# Patient Record
Sex: MALE | Race: BLACK OR AFRICAN AMERICAN | Employment: FULL TIME | URBAN - METROPOLITAN AREA
[De-identification: names, ages, dates, MRNs, and addresses within clinical notes are randomized per-mention and may not be internally consistent; named-entity substitution may affect disease eponyms.]

---

## 2017-04-05 DIAGNOSIS — I10 ESSENTIAL HYPERTENSION, BENIGN: ICD-10-CM

## 2017-04-05 RX ORDER — LISINOPRIL AND HYDROCHLOROTHIAZIDE 12.5; 2 MG/1; MG/1
1 TABLET ORAL DAILY
Qty: 90 TAB | Refills: 3 | Status: SHIPPED | OUTPATIENT
Start: 2017-04-05 | End: 2017-05-11 | Stop reason: SDUPTHER

## 2017-04-05 NOTE — TELEPHONE ENCOUNTER
4/5/2017    Requested Prescriptions     Pending Prescriptions Disp Refills    lisinopril-hydroCHLOROthiazide (PRINZIDE, ZESTORETIC) 20-12.5 mg per tablet 90 Tab 3     Sig: Take 1 Tab by mouth daily. Quantity requested: 90 day supply  [facility]     Patient advised of 72 hour turnaround: Yes    Other Comments: pt states that he will have about 5 more days left of this medication.

## 2017-04-20 ENCOUNTER — OFFICE VISIT (OUTPATIENT)
Dept: INTERNAL MEDICINE CLINIC | Age: 47
End: 2017-04-20

## 2017-04-20 VITALS
SYSTOLIC BLOOD PRESSURE: 116 MMHG | TEMPERATURE: 98.2 F | WEIGHT: 202 LBS | OXYGEN SATURATION: 98 % | HEART RATE: 89 BPM | BODY MASS INDEX: 26.77 KG/M2 | HEIGHT: 73 IN | DIASTOLIC BLOOD PRESSURE: 76 MMHG

## 2017-04-20 DIAGNOSIS — Z80.0 FH: COLON CANCER: ICD-10-CM

## 2017-04-20 DIAGNOSIS — E78.00 PURE HYPERCHOLESTEROLEMIA: ICD-10-CM

## 2017-04-20 DIAGNOSIS — I10 ESSENTIAL HYPERTENSION, BENIGN: Primary | ICD-10-CM

## 2017-04-20 NOTE — MR AVS SNAPSHOT
Visit Information Date & Time Provider Department Dept. Phone Encounter #  
 4/20/2017  8:45 AM Carol Goins, [address] [place] [address],[address] [phone] [id number] Follow-up Instructions Return in about 1 year (around 4/20/2018) for htn hld. Upcoming Health Maintenance Date Due DTaP/Tdap/Td series (1 - Tdap) 3/14/1991 INFLUENZA AGE 9 TO ADULT 8/1/2016 Allergies as of 4/20/2017  Review Complete On: 4/20/2017 By: Carol Goins MD  
 No Known Allergies Current Immunizations  Never Reviewed Name Date Influenza Vaccine Split 11/11/2010 Not reviewed this visit You Were Diagnosed With   
  
 Codes Comments Essential hypertension, benign    -  Primary ICD-10-CM: I10 
ICD-9-CM: 401.1 Pure hypercholesterolemia     ICD-10-CM: E78.00 ICD-9-CM: 272.0 FH: colon cancer     ICD-10-CM: Z80.0 ICD-9-CM: V16.0 Vitals BP Pulse Temp Height(growth percentile) Weight(growth percentile) SpO2  
 116/76 (BP 1 Location: Left arm, BP Patient Position: Sitting) 89 98.2 °F (36.8 °C) (Oral) 6' 1\" (1.854 m) 202 lb (91.6 kg) 98% BMI Smoking Status 26.65 kg/m2 Never Smoker BMI and BSA Data Body Mass Index Body Surface Area  
 26.65 kg/m 2 2.17 m 2 Preferred Pharmacy Pharmacy Name Phone [facility]Y [id number] [address] [phone] Your Updated Medication List  
  
   
This list is accurate as of: 4/20/17  9:29 AM.  Always use your most recent med list.  
  
  
  
  
 atorvastatin 20 mg tablet Commonly known as:  LIPITOR Take 1 Tab by mouth nightly. lisinopril-hydroCHLOROthiazide 20-12.5 mg per tablet Commonly known as:  Leslie Nation Take 1 Tab by mouth daily. MULTIVITAL PO Take  by mouth. We Performed the Following CBC W/O DIFF [99848 CPT(R)] LIPID PANEL [06254 CPT(R)] METABOLIC PANEL, COMPREHENSIVE [48440 CPT(R)] REFERRAL TO GASTROENTEROLOGY [FGU32 Custom] Comments:  
 Please evaluate patient for screening colonoscopy TSH 3RD GENERATION [94846 CPT(R)] Follow-up Instructions Return in about 1 year (around 4/20/2018) for htn hld. Referral Information Referral ID Referred By Referred To  
  
 [id number] BRETT, [address] [facility] [address] [facility] [address], [address] Visits Status Start Date End Date 1 New Request 4/20/17 4/20/18 If your referral has a status of pending review or denied, additional information will be sent to support the outcome of this decision. Introducing [facility] & HEALTH SERVICES! Yanni Khan introduces [employer] patient portal. Now you can access parts of your medical record, email your doctor's office, and request medication refills online. 1. In your internet browser, go to [email or web address] 2. Click on the First Time User? Click Here link in the Sign In box. You will see the New Member Sign Up page. 3. Enter your [employer] Access Code exactly as it appears below. You will not need to use this code after youve completed the sign-up process. If you do not sign up before the expiration date, you must request a new code. · [employer] Access Code: [email or web address] Expires: 7/19/2017  9:29 AM 
 
4. Enter the last four digits of your Social Security Number (xxxx) and Date of Birth (mm/dd/yyyy) as indicated and click Submit. You will be taken to the next sign-up page. 5. Create a [employer] ID. This will be your [employer] login ID and cannot be changed, so think of one that is secure and easy to remember. 6. Create a [employer] password. You can change your password at any time. 7. Enter your Password Reset Question and Answer. This can be used at a later time if you forget your password. 8. Enter your e-mail address.  You will receive e-mail notification when new information is available in Roadmunk. 9. Click Sign Up. You can now view and download portions of your medical record. 10. Click the Download Summary menu link to download a portable copy of your medical information. If you have questions, please visit the Frequently Asked Questions section of the [employer] website. Remember, [employer] is NOT to be used for urgent needs. For medical emergencies, dial 911. Now available from your iPhone and Android! Please provide this summary of care documentation to your next provider. Your primary care clinician is listed as Bernardino WEST. If you have any questions after today's visit, please call [phone].

## 2017-04-20 NOTE — PROGRESS NOTES
HISTORY OF PRESENT ILLNESS  Belinda Quiroz is a 52 y.o. male. HPI     F/u HTN and HLD  Not taking lipitor  Mother just dx with colon cancer at age 79  Had colonocopy about 10 yrs ago--negative per pt  Weight up a few lbs , not exercising but generally feels ok  No cp or sob sxs  Patient Active Problem List    Diagnosis Date Noted    Panic disorder 11/11/2010    Essential hypertension, benign 05/13/2010    Microscopic hematuria 05/13/2010    Gynecomastia, male 05/13/2010    Pure hypercholesterolemia 05/13/2010     Current Outpatient Prescriptions   Medication Sig Dispense Refill    lisinopril-hydroCHLOROthiazide (PRINZIDE, ZESTORETIC) 20-12.5 mg per tablet Take 1 Tab by mouth daily. 90 Tab 3    FA/MV,CA,FE,MIN/LYCOPENE/LUT (MULTIVITAL PO) Take  by mouth.  atorvastatin (LIPITOR) 20 mg tablet Take 1 Tab by mouth nightly. 30 Tab 1     No Known Allergies  Social History   Substance Use Topics    Smoking status: Never Smoker    Smokeless tobacco: Never Used    Alcohol use Yes      Comment: occasional      Lab Results  Component Value Date/Time   Glucose 85 04/10/2015 09:20 AM   LDL, calculated 129 04/10/2015 09:20 AM   Creatinine 1.02 04/10/2015 09:20 AM      Lab Results  Component Value Date/Time   Cholesterol, total 201 04/10/2015 09:20 AM   HDL Cholesterol 57 04/10/2015 09:20 AM   LDL, calculated 129 04/10/2015 09:20 AM   Triglyceride 77 04/10/2015 09:20 AM       Lab Results  Component Value Date/Time   GFR est  04/10/2015 09:20 AM   GFR est non-AA 88 04/10/2015 09:20 AM   Creatinine 1.02 04/10/2015 09:20 AM   BUN 17 04/10/2015 09:20 AM   Sodium 140 04/10/2015 09:20 AM   Potassium 4.4 04/10/2015 09:20 AM   Chloride 99 04/10/2015 09:20 AM   CO2 28 04/10/2015 09:20 AM         ROS    Physical Exam   Constitutional: He appears well-developed and well-nourished. No distress. Appears stated age, overweight, nad   HENT:   Head: Normocephalic.    Cardiovascular: Normal rate, regular rhythm and normal heart sounds. Exam reveals no gallop and no friction rub. Pulmonary/Chest: Effort normal and breath sounds normal. No respiratory distress. He has no wheezes. He has no rales. He exhibits no tenderness. Abdominal: Soft. Musculoskeletal: He exhibits no edema. Neurological: He is alert. Psychiatric: He has a normal mood and affect. Nursing note and vitals reviewed. 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 Diagnoses and all orders for this visit:    Essential hypertension, benign  -     CBC W/O DIFF  -     METABOLIC PANEL, COMPREHENSIVE   Good control    Pure hypercholesterolemia  -     LIPID PANEL  -     TSH 3RD GENERATION   Managing diet -encouraged regular exercise  FH: colon cancer  -     REFERRAL TO GASTROENTEROLOGY      Follow-up Disposition:  Return in about 1 year (around 4/20/2018) for htn hld.

## 2017-04-21 LAB
ALBUMIN SERPL-MCNC: 4.5 G/DL (ref 3.5–5.5)
ALBUMIN/GLOB SERPL: 1.7 {RATIO} (ref 1.2–2.2)
ALP SERPL-CCNC: 48 IU/L (ref 39–117)
ALT SERPL-CCNC: 31 IU/L (ref 0–44)
AST SERPL-CCNC: 23 IU/L (ref 0–40)
BILIRUB SERPL-MCNC: 0.6 MG/DL (ref 0–1.2)
BUN SERPL-MCNC: 13 MG/DL (ref 6–24)
BUN/CREAT SERPL: 14 (ref 9–20)
CALCIUM SERPL-MCNC: 9.5 MG/DL (ref 8.7–10.2)
CHLORIDE SERPL-SCNC: 99 MMOL/L (ref 96–106)
CHOLEST SERPL-MCNC: 200 MG/DL (ref 100–199)
CO2 SERPL-SCNC: 25 MMOL/L (ref 18–29)
CREAT SERPL-MCNC: 0.91 MG/DL (ref 0.76–1.27)
ERYTHROCYTE [DISTWIDTH] IN BLOOD BY AUTOMATED COUNT: 13.5 % (ref 12.3–15.4)
GLOBULIN SER CALC-MCNC: 2.6 G/DL (ref 1.5–4.5)
GLUCOSE SERPL-MCNC: 84 MG/DL (ref 65–99)
HCT VFR BLD AUTO: 45.3 % (ref 37.5–51)
HDLC SERPL-MCNC: 60 MG/DL
HGB BLD-MCNC: 15.3 G/DL (ref 12.6–17.7)
LDLC SERPL CALC-MCNC: 125 MG/DL (ref 0–99)
MCH RBC QN AUTO: 29.7 PG (ref 26.6–33)
MCHC RBC AUTO-ENTMCNC: 33.8 G/DL (ref 31.5–35.7)
MCV RBC AUTO: 88 FL (ref 79–97)
PLATELET # BLD AUTO: 227 X10E3/UL (ref 150–379)
POTASSIUM SERPL-SCNC: 4.2 MMOL/L (ref 3.5–5.2)
PROT SERPL-MCNC: 7.1 G/DL (ref 6–8.5)
RBC # BLD AUTO: 5.15 X10E6/UL (ref 4.14–5.8)
SODIUM SERPL-SCNC: 139 MMOL/L (ref 134–144)
TRIGL SERPL-MCNC: 74 MG/DL (ref 0–149)
TSH SERPL DL<=0.005 MIU/L-ACNC: 1.1 UIU/ML (ref 0.45–4.5)
VLDLC SERPL CALC-MCNC: 15 MG/DL (ref 5–40)
WBC # BLD AUTO: 5.2 X10E3/UL (ref 3.4–10.8)

## 2017-05-11 DIAGNOSIS — I10 ESSENTIAL HYPERTENSION, BENIGN: ICD-10-CM

## 2017-05-11 RX ORDER — LISINOPRIL AND HYDROCHLOROTHIAZIDE 12.5; 2 MG/1; MG/1
1 TABLET ORAL DAILY
Qty: 90 TAB | Refills: 3 | Status: SHIPPED | OUTPATIENT
Start: 2017-05-11 | End: 2018-08-02 | Stop reason: SDUPTHER

## 2017-05-11 NOTE — TELEPHONE ENCOUNTER
August Verde is requesting to have the Rx Lisinopril hctz 20-12.5mg tabs  written as 90 day supply with refills to [facility] [phone]. Please contact upon completion.        Message received & copied from [facility] after closing on 5/10/17

## 2018-08-02 DIAGNOSIS — I10 ESSENTIAL HYPERTENSION, BENIGN: ICD-10-CM

## 2018-08-02 NOTE — TELEPHONE ENCOUNTER
Patient made appt for 9/6/18. Brock Deleon is out of refills on his lisinopril and will need just one more refill to get him through to his appt date. [facility] [address]  His number is [phone].        Message received & copied from [address] [place] [address]

## 2018-08-07 RX ORDER — LISINOPRIL AND HYDROCHLOROTHIAZIDE 12.5; 2 MG/1; MG/1
1 TABLET ORAL DAILY
Qty: 90 TAB | Refills: 3 | Status: SHIPPED | OUTPATIENT
Start: 2018-08-07 | End: 2019-07-25 | Stop reason: SDUPTHER

## 2018-08-07 NOTE — TELEPHONE ENCOUNTER
PCP: Edgardo Pantoja MD    Last appt: 4/20/2017  Future Appointments  Date Time Provider Shahana Kim   9/6/2018 9:00 AM Edgardo Pantoja MD [facility] 87       Requested Prescriptions     Pending Prescriptions Disp Refills    lisinopril-hydroCHLOROthiazide (PRINZIDE, ZESTORETIC) 20-12.5 mg per tablet 90 Tab 3     Sig: Take 1 Tab by mouth daily.

## 2018-08-07 NOTE — TELEPHONE ENCOUNTER
Pt calling today as he made an appt as instructed and has yet to hear anything on this refill request.  Why he ask? Pt is out medication.

## 2018-09-05 NOTE — PROGRESS NOTES
HISTORY OF PRESENT ILLNESS  Lacho Richardson is a 50 y.o. male. HPI        F/u HTN and HLD  Microscopic hematuria and FH colon cancer  Had colonoscopy 10/2017--no polyps Dr Iqbal Party    Had lipids checked at work -wnl per pt  Not on lipitor or statin  Some fatigue and moodiness and feels depressed after intercourse  Last OV  Not taking lipitor  Mother just dx with colon cancer at age 79  Had colonocopy about 10 yrs ago--negative per pt  Weight up a few lbs , not exercising but generally feels ok  No cp or sob sxs      Patient Active Problem List    Diagnosis Date Noted    Panic disorder 11/11/2010    Essential hypertension, benign 05/13/2010    Microscopic hematuria 05/13/2010    Gynecomastia, male 05/13/2010    Pure hypercholesterolemia 05/13/2010     Current Outpatient Prescriptions   Medication Sig Dispense Refill    lisinopril-hydroCHLOROthiazide (PRINZIDE, ZESTORETIC) 20-12.5 mg per tablet Take 1 Tab by mouth daily. 90 Tab 3    atorvastatin (LIPITOR) 20 mg tablet Take 1 Tab by mouth nightly. 30 Tab 1    FA/MV,CA,FE,MIN/LYCOPENE/LUT (MULTIVITAL PO) Take  by mouth.        No Known Allergies   Lab Results  Component Value Date/Time   WBC 5.2 04/20/2017 09:43 AM   HGB 15.3 04/20/2017 09:43 AM   HCT 45.3 04/20/2017 09:43 AM   PLATELET 646 46/86/2597 09:43 AM   MCV 88 04/20/2017 09:43 AM     Lab Results  Component Value Date/Time   Glucose 84 04/20/2017 09:43 AM   LDL, calculated 125 (H) 04/20/2017 09:43 AM   Creatinine 0.91 04/20/2017 09:43 AM      Lab Results  Component Value Date/Time   Cholesterol, total 200 (H) 04/20/2017 09:43 AM   HDL Cholesterol 60 04/20/2017 09:43 AM   LDL, calculated 125 (H) 04/20/2017 09:43 AM   Triglyceride 74 04/20/2017 09:43 AM     Lab Results  Component Value Date/Time   GFR est non- 04/20/2017 09:43 AM   GFR est  04/20/2017 09:43 AM   Creatinine 0.91 04/20/2017 09:43 AM   BUN 13 04/20/2017 09:43 AM   Sodium 139 04/20/2017 09:43 AM   Potassium 4.2 04/20/2017 09:43 AM Chloride 99 04/20/2017 09:43 AM   CO2 25 04/20/2017 09:43 AM     Lab Results  Component Value Date/Time   Prostate Specific Ag 0.8 04/10/2015 09:20 AM   Prostate Specific Ag 0.8 01/02/2013 09:11 AM   Prostate Specific Ag 0.8 12/27/2011 02:34 PM        ROS    Physical Exam   Constitutional: He appears well-developed and well-nourished. No distress. Appears stated age   HENT:   Head: Normocephalic. Cardiovascular: Normal rate, regular rhythm and normal heart sounds. Exam reveals no gallop and no friction rub. No murmur heard. Pulmonary/Chest: Effort normal and breath sounds normal. No respiratory distress. He has no wheezes. He has no rales. He exhibits no tenderness. Abdominal: Soft. Musculoskeletal: He exhibits no edema. Neurological: He is alert. Psychiatric: He has a normal mood and affect. Nursing note and vitals reviewed. ASSESSMENT and PLAN  Diagnoses and all orders for this visit:    1. Essential hypertension, benign  -     CBC W/O DIFF  -     METABOLIC PANEL, COMPREHENSIVE   controlled on losartan    2. Pure hypercholesterolemia  -     METABOLIC PANEL, COMPREHENSIVE  -     LIPID PANEL   Manage with diet and exercise  3. Prostate cancer screening  -     PSA SCREENING (SCREENING) ()   FH prostate cancer  4. Microscopic hematuria  -     URINALYSIS W/ RFLX MICROSCOPIC   Neg w/u in the past  5. Fatigue, unspecified type  -     TESTOSTERONE, TOTAL, ADULT MALE    6.  Encounter for immunization  -     Tetanus, diphtheria toxoids and acellular pertussis (TDAP) vaccine, in individuals >=7 years, IM      Follow-up Disposition:  Return 6 mos htn hld

## 2018-09-06 ENCOUNTER — OFFICE VISIT (OUTPATIENT)
Dept: INTERNAL MEDICINE CLINIC | Age: 48
End: 2018-09-06

## 2018-09-06 VITALS
HEART RATE: 95 BPM | DIASTOLIC BLOOD PRESSURE: 76 MMHG | HEIGHT: 73 IN | BODY MASS INDEX: 26.37 KG/M2 | OXYGEN SATURATION: 97 % | TEMPERATURE: 98.7 F | SYSTOLIC BLOOD PRESSURE: 118 MMHG | WEIGHT: 199 LBS

## 2018-09-06 DIAGNOSIS — I10 ESSENTIAL HYPERTENSION, BENIGN: Primary | ICD-10-CM

## 2018-09-06 DIAGNOSIS — Z23 ENCOUNTER FOR IMMUNIZATION: ICD-10-CM

## 2018-09-06 DIAGNOSIS — E78.00 PURE HYPERCHOLESTEROLEMIA: ICD-10-CM

## 2018-09-06 DIAGNOSIS — R53.83 FATIGUE, UNSPECIFIED TYPE: ICD-10-CM

## 2018-09-06 DIAGNOSIS — Z12.5 PROSTATE CANCER SCREENING: ICD-10-CM

## 2018-09-06 DIAGNOSIS — R31.29 MICROSCOPIC HEMATURIA: ICD-10-CM

## 2018-09-06 NOTE — MR AVS SNAPSHOT
[address] 
[phone] Patient: Jorge Shaw MRN:  [id number] Visit Information Date & Time Provider Department Dept. Phone Encounter #  
 9/6/2018  9:00 AM Nino Chloe, [address] [place] [address],[address] [phone] [id number] Follow-up Instructions Return for cpe. Upcoming Health Maintenance Date Due DTaP/Tdap/Td series (1 - Tdap) 3/14/1991 Influenza Age 5 to Adult 3/31/2019* *Topic was postponed. The date shown is not the original due date. Allergies as of 9/6/2018  Review Complete On: 9/6/2018 By: Ida Dale LPN No Known Allergies Current Immunizations  Never Reviewed Name Date Influenza Vaccine Split 11/11/2010 Tdap  Incomplete Not reviewed this visit You Were Diagnosed With   
  
 Codes Comments Essential hypertension, benign    -  Primary ICD-10-CM: I10 
ICD-9-CM: 401.1 Pure hypercholesterolemia     ICD-10-CM: E78.00 ICD-9-CM: 272.0 Prostate cancer screening     ICD-10-CM: Z12.5 ICD-9-CM: V76.44 Microscopic hematuria     ICD-10-CM: R31.29 ICD-9-CM: 599.72 Fatigue, unspecified type     ICD-10-CM: R53.83 ICD-9-CM: 780.79 Encounter for immunization     ICD-10-CM: N19 ICD-9-CM: V03.89 Vitals BP Pulse Temp Height(growth percentile) Weight(growth percentile) SpO2  
 118/76 (BP 1 Location: Left arm, BP Patient Position: Sitting) 95 98.7 °F (37.1 °C) (Oral) 6' 1\" (1.854 m) 199 lb (90.3 kg) 97% BMI Smoking Status 26.25 kg/m2 Never Smoker BMI and BSA Data Body Mass Index Body Surface Area  
 26.25 kg/m 2 2.16 m 2 [id number] [place] [address] [phone] Your Updated Medication List  
  
   
 This list is accurate as of 9/6/18  9:46 AM.  Always use your most recent med list.  
  
  
  
  
 lisinopril-hydroCHLOROthiazide 20-12.5 mg per tablet Commonly known as:  Birder Haste Take 1 Tab by mouth daily. MULTIVITAL PO Take  by mouth. We Performed the Following CBC W/O DIFF [96984 CPT(R)] LIPID PANEL [73846 CPT(R)] METABOLIC PANEL, COMPREHENSIVE [60582 CPT(R)] PSA SCREENING (SCREENING) [ HCP] TESTOSTERONE, TOTAL, ADULT MALE [34709 CPT(R)] TETANUS, DIPHTHERIA TOXOIDS AND ACELLULAR PERTUSSIS VACCINE (TDAP), IN INDIVIDS. >=7, IM [id number] CPT(R)] URINALYSIS W/ RFLX MICROSCOPIC [15201 CPT(R)] Follow-up Instructions Return for cpe. Introducing [facility] & HEALTH SERVICES! [employer] introduces [employer] patient portal. Now you can access parts of your medical record, email your doctor's office, and request medication refills online. 1. In your internet browser, go to [email or web address] 2. Click on the First Time User? Click Here link in the Sign In box. You will see the New Member Sign Up page. 3. Enter your [employer] Access Code exactly as it appears below. You will not need to use this code after youve completed the sign-up process. If you do not sign up before the expiration date, you must request a new code. · [employer] Access Code: [email or web address] Expires: 12/5/2018  9:46 AM 
 
4. Enter the last four digits of your Social Security Number (xxxx) and Date of Birth (mm/dd/yyyy) as indicated and click Submit. You will be taken to the next sign-up page. 5. Create a [employer] ID. This will be your [employer] login ID and cannot be changed, so think of one that is secure and easy to remember. 6. Create a [employer] password. You can change your password at any time. 7. Enter your Password Reset Question and Answer. This can be used at a later time if you forget your password. 8. Enter your e-mail address. You will receive e-mail notification when new information is available in [address] 9. Click Sign Up. You can now view and download portions of your medical record. 10. Click the Download Summary menu link to download a portable copy of your medical information. If you have questions, please visit the Frequently Asked Questions section of the Popcorn network website. Remember, [employer] is NOT to be used for urgent needs. For medical emergencies, dial 911. Now available from your iPhone and Android! Please provide this summary of care documentation to your next provider. Your primary care clinician is listed as Renetta WEST. If you have any questions after today's visit, please call [phone].

## 2018-09-06 NOTE — PROGRESS NOTES
Chief Complaint   Patient presents with    Hypertension     follow up     Medication Evaluation     bp mediaction    Depression     mood swings and low engery? low T level

## 2018-09-07 DIAGNOSIS — R79.89 LOW TESTOSTERONE IN MALE: Primary | ICD-10-CM

## 2018-09-07 LAB
ALBUMIN SERPL-MCNC: 4.5 G/DL (ref 3.5–5.5)
ALBUMIN/GLOB SERPL: 1.5 {RATIO} (ref 1.2–2.2)
ALP SERPL-CCNC: 51 IU/L (ref 39–117)
ALT SERPL-CCNC: 25 IU/L (ref 0–44)
APPEARANCE UR: CLEAR
AST SERPL-CCNC: 21 IU/L (ref 0–40)
BACTERIA #/AREA URNS HPF: NORMAL /[HPF]
BILIRUB SERPL-MCNC: 0.5 MG/DL (ref 0–1.2)
BILIRUB UR QL STRIP: NEGATIVE
BUN SERPL-MCNC: 13 MG/DL (ref 6–24)
BUN/CREAT SERPL: 12 (ref 9–20)
CALCIUM SERPL-MCNC: 9.7 MG/DL (ref 8.7–10.2)
CASTS URNS QL MICRO: NORMAL /LPF
CHLORIDE SERPL-SCNC: 100 MMOL/L (ref 96–106)
CHOLEST SERPL-MCNC: 200 MG/DL (ref 100–199)
CO2 SERPL-SCNC: 25 MMOL/L (ref 20–29)
COLOR UR: YELLOW
CREAT SERPL-MCNC: 1.07 MG/DL (ref 0.76–1.27)
EPI CELLS #/AREA URNS HPF: NORMAL /HPF
ERYTHROCYTE [DISTWIDTH] IN BLOOD BY AUTOMATED COUNT: 13.3 % (ref 12.3–15.4)
GLOBULIN SER CALC-MCNC: 3 G/DL (ref 1.5–4.5)
GLUCOSE SERPL-MCNC: 85 MG/DL (ref 65–99)
GLUCOSE UR QL: NEGATIVE
HCT VFR BLD AUTO: 43.2 % (ref 37.5–51)
HDLC SERPL-MCNC: 55 MG/DL
HGB BLD-MCNC: 14.8 G/DL (ref 13–17.7)
HGB UR QL STRIP: ABNORMAL
KETONES UR QL STRIP: NEGATIVE
LDLC SERPL CALC-MCNC: 130 MG/DL (ref 0–99)
LEUKOCYTE ESTERASE UR QL STRIP: NEGATIVE
MCH RBC QN AUTO: 29.4 PG (ref 26.6–33)
MCHC RBC AUTO-ENTMCNC: 34.3 G/DL (ref 31.5–35.7)
MCV RBC AUTO: 86 FL (ref 79–97)
MICRO URNS: ABNORMAL
MUCOUS THREADS URNS QL MICRO: PRESENT
NITRITE UR QL STRIP: NEGATIVE
PH UR STRIP: 5.5 [PH] (ref 5–7.5)
PLATELET # BLD AUTO: 230 X10E3/UL (ref 150–379)
POTASSIUM SERPL-SCNC: 3.7 MMOL/L (ref 3.5–5.2)
PROT SERPL-MCNC: 7.5 G/DL (ref 6–8.5)
PROT UR QL STRIP: NEGATIVE
PSA SERPL-MCNC: 1.5 NG/ML (ref 0–4)
RBC # BLD AUTO: 5.03 X10E6/UL (ref 4.14–5.8)
RBC #/AREA URNS HPF: NORMAL /HPF
SODIUM SERPL-SCNC: 140 MMOL/L (ref 134–144)
SP GR UR: 1.02 (ref 1–1.03)
TESTOST SERPL-MCNC: 235 NG/DL (ref 264–916)
TRIGL SERPL-MCNC: 77 MG/DL (ref 0–149)
UROBILINOGEN UR STRIP-MCNC: 0.2 MG/DL (ref 0.2–1)
VLDLC SERPL CALC-MCNC: 15 MG/DL (ref 5–40)
WBC # BLD AUTO: 5.8 X10E3/UL (ref 3.4–10.8)
WBC #/AREA URNS HPF: NORMAL /HPF

## 2019-06-12 ENCOUNTER — TELEPHONE (OUTPATIENT)
Dept: INTERNAL MEDICINE CLINIC | Age: 49
End: 2019-06-12

## 2019-06-12 NOTE — TELEPHONE ENCOUNTER
----- Message from Prasanna Kan sent at 6/12/2019 10:00 AM EDT -----  Regarding: DR WEST / Nilesh Bolaños wife is requesting to speak with nurse in regard to a recommended Gastrologist. Due to continued stomach irritation.        Best contact: [phone]  or [phone]       Copy/paste envera

## 2019-06-12 NOTE — TELEPHONE ENCOUNTER
Called, spoke to pt. Two identifiers confirmed. Contact information given to pt for Dr. Michelle Hearn (previously done pt's colonoscopy). Pt verbalized understanding of information discussed w/ no further questions at this time.

## 2019-07-24 PROBLEM — Z80.0 FH: COLON CANCER: Status: ACTIVE | Noted: 2019-07-24

## 2019-07-25 ENCOUNTER — OFFICE VISIT (OUTPATIENT)
Dept: INTERNAL MEDICINE CLINIC | Age: 49
End: 2019-07-25

## 2019-07-25 VITALS
OXYGEN SATURATION: 99 % | SYSTOLIC BLOOD PRESSURE: 107 MMHG | HEIGHT: 73 IN | WEIGHT: 197 LBS | TEMPERATURE: 98.5 F | RESPIRATION RATE: 16 BRPM | DIASTOLIC BLOOD PRESSURE: 69 MMHG | HEART RATE: 89 BPM | BODY MASS INDEX: 26.11 KG/M2

## 2019-07-25 DIAGNOSIS — Z12.5 PROSTATE CANCER SCREENING: ICD-10-CM

## 2019-07-25 DIAGNOSIS — I10 ESSENTIAL HYPERTENSION: Primary | ICD-10-CM

## 2019-07-25 DIAGNOSIS — I10 ESSENTIAL HYPERTENSION, BENIGN: ICD-10-CM

## 2019-07-25 DIAGNOSIS — E78.00 PURE HYPERCHOLESTEROLEMIA: ICD-10-CM

## 2019-07-25 DIAGNOSIS — R79.89 LOW TESTOSTERONE IN MALE: ICD-10-CM

## 2019-07-25 RX ORDER — LISINOPRIL AND HYDROCHLOROTHIAZIDE 12.5; 2 MG/1; MG/1
1 TABLET ORAL DAILY
Qty: 90 TAB | Refills: 3 | Status: SHIPPED | OUTPATIENT
Start: 2019-07-25 | End: 2020-08-25 | Stop reason: SDUPTHER

## 2019-07-25 NOTE — PROGRESS NOTES
Chief Complaint   Patient presents with    Hypertension     Routine    Labs     Routine    Cholesterol Problem     Routine

## 2019-07-25 NOTE — PROGRESS NOTES
HISTORY OF PRESENT ILLNESS  Surya Stauffer is a 52 y.o. male. HPI          F/u HTN and HLD  Microscopic hematuria and FH colon cancer   Low testosterone level 235 last OV with sxs of fatigue--repeat was recommended    Saw GI Dr Vinny Long for gas pain in left lower abdomen--no procedure recommended    Last OV  Had colonoscopy 10/2017--no polyps Dr Vinny Long     Had lipids checked at work -wnl per pt  Not on lipitor or statin  Some fatigue and moodiness and feels depressed after intercourse  Last OV  Not taking lipitor  Mother just dx with colon cancer at age 79  Had colonocopy about 10 yrs ago--negative per pt  Weight up a few lbs , not exercising but generally feels ok  No cp or sob sxs     Patient Active Problem List    Diagnosis Date Noted    Panic disorder 11/11/2010    Essential hypertension, benign 05/13/2010    Microscopic hematuria 05/13/2010    Gynecomastia, male 05/13/2010    Pure hypercholesterolemia 05/13/2010     Current Outpatient Medications   Medication Sig Dispense Refill    lisinopril-hydroCHLOROthiazide (PRINZIDE, ZESTORETIC) 20-12.5 mg per tablet Take 1 Tab by mouth daily. 90 Tab 3    FA/MV,CA,FE,MIN/LYCOPENE/LUT (MULTIVITAL PO) Take  by mouth.        No Known Allergies  Social History     Tobacco Use    Smoking status: Never Smoker    Smokeless tobacco: Never Used   Substance Use Topics    Alcohol use: Yes     Comment: occasional      Lab Results   Component Value Date/Time    WBC 5.8 09/06/2018 10:09 AM    HGB 14.8 09/06/2018 10:09 AM    HCT 43.2 09/06/2018 10:09 AM    PLATELET 851 35/31/0370 10:09 AM    MCV 86 09/06/2018 10:09 AM     Lab Results   Component Value Date/Time    Glucose 85 09/06/2018 10:09 AM    LDL, calculated 130 (H) 09/06/2018 10:09 AM    Creatinine 1.07 09/06/2018 10:09 AM      Lab Results   Component Value Date/Time    Cholesterol, total 200 (H) 09/06/2018 10:09 AM    HDL Cholesterol 55 09/06/2018 10:09 AM    LDL, calculated 130 (H) 09/06/2018 10:09 AM Triglyceride 77 09/06/2018 10:09 AM     Lab Results   Component Value Date/Time    GFR est non-AA 82 09/06/2018 10:09 AM    GFR est AA 94 09/06/2018 10:09 AM    Creatinine 1.07 09/06/2018 10:09 AM    BUN 13 09/06/2018 10:09 AM    Sodium 140 09/06/2018 10:09 AM    Potassium 3.7 09/06/2018 10:09 AM    Chloride 100 09/06/2018 10:09 AM    CO2 25 09/06/2018 10:09 AM        ROS    Physical Exam   Constitutional: He appears well-developed and well-nourished. No distress. Appears stated age   HENT:   Head: Normocephalic. Cardiovascular: Normal rate, regular rhythm and normal heart sounds. Exam reveals no gallop and no friction rub. No murmur heard. Pulmonary/Chest: Effort normal and breath sounds normal. No respiratory distress. He has no wheezes. He has no rales. He exhibits no tenderness. Abdominal: Soft. Musculoskeletal: He exhibits no edema. Neurological: He is alert. Psychiatric: He has a normal mood and affect. Nursing note and vitals reviewed. ASSESSMENT and PLAN  Diagnoses and all orders for this visit:    1. Essential hypertension  -     METABOLIC PANEL, BASIC  -     CBC W/O DIFF   controlled  2. Pure hypercholesterolemia  -     LIPID PANEL   Managing with diet and exercise for now  3. Low testosterone in male  -     TESTOSTERONE, TOTAL, ADULT MALE    4. Prostate cancer screening  -     PSA W/ REFLX FREE PSA    5. Essential hypertension, benign  -     lisinopril-hydroCHLOROthiazide (PRINZIDE, ZESTORETIC) 20-12.5 mg per tablet; Take 1 Tab by mouth daily. Follow-up and Dispositions    · Return in about 6 months (around 1/25/2020) for CPE with ekg.

## 2019-07-25 NOTE — PATIENT INSTRUCTIONS
Office Policies    Phone calls/patient messages:            Please allow up to 24 hours for someone in the office to contact you about your call or message. Be mindful your provider may be out of the office or your message may require further review. We encourage you to use [employer] for your messages as this is a faster, more efficient way to communicate with our office                         Medication Refills:            Prescription medications require 48-72 business hours to process. We encourage you to use [employer] for your refills. For controlled medications: Please allow 72 business hours to process. Certain medications may require you to  a written prescription at our office. NO narcotic/controlled medications will be prescribed after 4pm Monday through Friday or on weekends              Form/Paperwork Completion:            Please note a $25 fee may incur for all paperwork for completed by our providers. We ask that you allow 7-10 business days. Pre-payment is due prior to picking up/faxing the completed form. You may also download your forms to [employer] to have your doctor print off.

## 2019-07-26 LAB
BUN SERPL-MCNC: 14 MG/DL (ref 6–24)
BUN/CREAT SERPL: 13 (ref 9–20)
CALCIUM SERPL-MCNC: 9.5 MG/DL (ref 8.7–10.2)
CHLORIDE SERPL-SCNC: 99 MMOL/L (ref 96–106)
CHOLEST SERPL-MCNC: 178 MG/DL (ref 100–199)
CO2 SERPL-SCNC: 25 MMOL/L (ref 20–29)
CREAT SERPL-MCNC: 1.1 MG/DL (ref 0.76–1.27)
ERYTHROCYTE [DISTWIDTH] IN BLOOD BY AUTOMATED COUNT: 12.8 % (ref 12.3–15.4)
GLUCOSE SERPL-MCNC: 79 MG/DL (ref 65–99)
HCT VFR BLD AUTO: 43.8 % (ref 37.5–51)
HDLC SERPL-MCNC: 54 MG/DL
HGB BLD-MCNC: 14.6 G/DL (ref 13–17.7)
LDLC SERPL CALC-MCNC: 102 MG/DL (ref 0–99)
MCH RBC QN AUTO: 29.3 PG (ref 26.6–33)
MCHC RBC AUTO-ENTMCNC: 33.3 G/DL (ref 31.5–35.7)
MCV RBC AUTO: 88 FL (ref 79–97)
PLATELET # BLD AUTO: 223 X10E3/UL (ref 150–450)
POTASSIUM SERPL-SCNC: 4 MMOL/L (ref 3.5–5.2)
PSA SERPL-MCNC: 0.9 NG/ML (ref 0–4)
RBC # BLD AUTO: 4.99 X10E6/UL (ref 4.14–5.8)
REFLEX CRITERIA: NORMAL
SODIUM SERPL-SCNC: 140 MMOL/L (ref 134–144)
TESTOST SERPL-MCNC: 238 NG/DL (ref 264–916)
TRIGL SERPL-MCNC: 110 MG/DL (ref 0–149)
VLDLC SERPL CALC-MCNC: 22 MG/DL (ref 5–40)
WBC # BLD AUTO: 6 X10E3/UL (ref 3.4–10.8)

## 2019-07-29 DIAGNOSIS — R79.89 LOW TESTOSTERONE IN MALE: Primary | ICD-10-CM

## 2019-07-31 LAB
SPECIMEN STATUS REPORT, ROLRST: NORMAL
TSH SERPL DL<=0.005 MIU/L-ACNC: 0.96 UIU/ML (ref 0.45–4.5)

## 2019-10-07 ENCOUNTER — TELEPHONE (OUTPATIENT)
Dept: INTERNAL MEDICINE CLINIC | Age: 49
End: 2019-10-07

## 2019-10-07 RX ORDER — PREDNISONE 20 MG/1
20 TABLET ORAL DAILY
Qty: 5 TAB | Refills: 0 | Status: SHIPPED | OUTPATIENT
Start: 2019-10-07 | End: 2019-12-02 | Stop reason: ALTCHOICE

## 2019-10-07 RX ORDER — AZITHROMYCIN 250 MG/1
250 TABLET, FILM COATED ORAL SEE ADMIN INSTRUCTIONS
Qty: 6 TAB | Refills: 0 | Status: SHIPPED | OUTPATIENT
Start: 2019-10-07 | End: 2019-10-12

## 2019-10-07 NOTE — TELEPHONE ENCOUNTER
Dominick Watts MD  You 16 minutes ago (4:12 PM)     tellpt I escribed zpak and prednisone x 5d    Routing comment      Pt notified of RX sent to pharmacy.

## 2019-10-07 NOTE — TELEPHONE ENCOUNTER
#[phone] pt states he was blowing dust out of his garage and since that time, a week 1/2 ago he has been wheezing and has chest congestion with mucus. [facility] is [facility]. Please call pt to let him know what you can do. Thanks.

## 2019-11-15 ENCOUNTER — TELEPHONE (OUTPATIENT)
Dept: INTERNAL MEDICINE CLINIC | Age: 49
End: 2019-11-15

## 2019-11-15 NOTE — TELEPHONE ENCOUNTER
#[phone] pt states he is still not doing better. Pt was taking OTC supplement, Ashwagandha while he was taking the medication you prescribed. Is this why the medication didn't work he is asking just in case? He is having tightness in the chest where it feels like he has to cough often, but doesn't. Do you want to see him or prescribe something else?  Please call pt

## 2019-11-30 NOTE — PROGRESS NOTES
HISTORY OF PRESENT ILLNESS  Negin Wayne is a 52 y.o. male. HPI   Here for acute care OV c/o chest congestion    Wheezing and chest congestion started 2 months ago. Feels like he has congestion in upper chest  zpak and pred quentin were call in 2 months ago which helped some  Non smoker  No chest pains      Patient Active Problem List    Diagnosis Date Noted    FH: colon cancer 07/24/2019    Panic disorder 11/11/2010    Essential hypertension, benign 05/13/2010    Microscopic hematuria 05/13/2010    Gynecomastia, male 05/13/2010    Pure hypercholesterolemia 05/13/2010     Current Outpatient Medications   Medication Sig Dispense Refill    predniSONE (DELTASONE) 20 mg tablet Take 20 mg by mouth daily. 5 Tab 0    lisinopril-hydroCHLOROthiazide (PRINZIDE, ZESTORETIC) 20-12.5 mg per tablet Take 1 Tab by mouth daily. 90 Tab 3    FA/MV,CA,FE,MIN/LYCOPENE/LUT (MULTIVITAL PO) Take  by mouth. No Known Allergies        ROS    Physical Exam  Vitals signs and nursing note reviewed. Constitutional:       General: He is not in acute distress. Appearance: He is well-developed. Comments: Appears stated age   HENT:      Head: Normocephalic. Right Ear: Tympanic membrane normal.      Left Ear: Tympanic membrane normal.      Mouth/Throat:      Pharynx: Oropharynx is clear. Neck:      Musculoskeletal: Normal range of motion. Cardiovascular:      Rate and Rhythm: Normal rate and regular rhythm. Heart sounds: Normal heart sounds. No murmur. No gallop. Pulmonary:      Effort: Pulmonary effort is normal. No respiratory distress. Breath sounds: Normal breath sounds. No stridor. No wheezing, rhonchi or rales. Chest:      Chest wall: No tenderness. Abdominal:      Palpations: Abdomen is soft. Lymphadenopathy:      Cervical: No cervical adenopathy. Neurological:      Mental Status: He is alert. ASSESSMENT and PLAN  Diagnoses and all orders for this visit:    1.  Chronic cough  - XR CHEST PA LAT; Future   [place]   Consider stopping aceinhibitor if not improving--discussed  Other orders  -     fluticasone furoate-vilanterol (BREO ELLIPTA) 200-25 mcg/dose inhaler; Take 1 Puff by inhalation daily.   rtc next month      rtc next month as scheduled

## 2019-12-02 ENCOUNTER — OFFICE VISIT (OUTPATIENT)
Dept: INTERNAL MEDICINE CLINIC | Age: 49
End: 2019-12-02

## 2019-12-02 VITALS
WEIGHT: 201 LBS | HEART RATE: 88 BPM | OXYGEN SATURATION: 97 % | SYSTOLIC BLOOD PRESSURE: 132 MMHG | BODY MASS INDEX: 26.64 KG/M2 | TEMPERATURE: 98.5 F | DIASTOLIC BLOOD PRESSURE: 79 MMHG | HEIGHT: 73 IN | RESPIRATION RATE: 16 BRPM

## 2019-12-02 DIAGNOSIS — R05.3 CHRONIC COUGH: Primary | ICD-10-CM

## 2019-12-02 RX ORDER — FLUTICASONE FUROATE AND VILANTEROL 200; 25 UG/1; UG/1
1 POWDER RESPIRATORY (INHALATION) DAILY
Qty: 1 INHALER | Refills: 0 | Status: SHIPPED | COMMUNITY
Start: 2019-12-02

## 2019-12-02 NOTE — PATIENT INSTRUCTIONS
Office Policies    Phone calls/patient messages:            Please allow up to 24 hours for someone in the office to contact you about your call or message. Be mindful your provider may be out of the office or your message may require further review. We encourage you to use [employer] for your messages as this is a faster, more efficient way to communicate with our office                         Medication Refills:            Prescription medications require 48-72 business hours to process. We encourage you to use [employer] for your refills. For controlled medications: Please allow 72 business hours to process. Certain medications may require you to  a written prescription at our office. NO narcotic/controlled medications will be prescribed after 4pm Monday through Friday or on weekends              Form/Paperwork Completion:            Please note a $25 fee may incur for all paperwork for completed by our providers. We ask that you allow 7-10 business days. Pre-payment is due prior to picking up/faxing the completed form. You may also download your forms to [employer] to have your doctor print off. [employer] Activation    Thank you for requesting access to [employer]. Please follow the instructions below to securely access and download your online medical record. [employer] allows you to send messages to your doctor, view your test results, renew your prescriptions, schedule appointments, and more. How Do I Sign Up? 1. In your internet browser, go to [email or web address]  2. Click on the First Time User? Click Here link in the Sign In box. You will be redirect to the New Member Sign Up page. 3. Enter your [employer] Access Code exactly as it appears below. You will not need to use this code after youve completed the sign-up process. If you do not sign up before the expiration date, you must request a new code.     [employer] Access Code: [email or web address]  Expires: 1/16/2020  9:34 AM (This is the date your [employer] access code will )    4. Enter the last four digits of your Social Security Number (xxxx) and Date of Birth (mm/dd/yyyy) as indicated and click Submit. You will be taken to the next sign-up page. 5. Create a [employer] ID. This will be your [employer] login ID and cannot be changed, so think of one that is secure and easy to remember. 6. Create a [employer] password. You can change your password at any time. 7. Enter your Password Reset Question and Answer. This can be used at a later time if you forget your password. 8. Enter your e-mail address. You will receive e-mail notification when new information is available in [address] 9. Click Sign Up. You can now view and download portions of your medical record. 10. Click the Download Summary menu link to download a portable copy of your medical information. Additional Information    If you have questions, please visit the Frequently Asked Questions section of the [employer] website at [email or web address] Remember, [employer] is NOT to be used for urgent needs. For medical emergencies, dial 911.

## 2019-12-02 NOTE — PROGRESS NOTES
Chief Complaint   Patient presents with    Cough     Dry and chest congestion  , Non productive cough x 2 months

## 2020-08-25 DIAGNOSIS — I10 ESSENTIAL HYPERTENSION, BENIGN: ICD-10-CM

## 2020-08-25 RX ORDER — LISINOPRIL AND HYDROCHLOROTHIAZIDE 12.5; 2 MG/1; MG/1
1 TABLET ORAL DAILY
Qty: 90 TAB | Refills: 3 | Status: SHIPPED | OUTPATIENT
Start: 2020-08-25 | End: 2021-10-03

## 2020-08-25 NOTE — TELEPHONE ENCOUNTER
Future Appointments:  No future appointments. Last Appointment With Me:  Visit date not found     Requested Prescriptions     Pending Prescriptions Disp Refills    lisinopril-hydroCHLOROthiazide (PRINZIDE, ZESTORETIC) 20-12.5 mg per tablet 90 Tab 3     Sig: Take 1 Tab by mouth daily.

## 2020-08-31 ENCOUNTER — TELEPHONE (OUTPATIENT)
Dept: INTERNAL MEDICINE CLINIC | Age: 50
End: 2020-08-31

## 2020-08-31 NOTE — TELEPHONE ENCOUNTER
Called, spoke to pt. Two identifiers confirmed. Recommended pt get a colonoscopy vs cologuard. Pt requesting referral be sent via [address] Pt verbalized understanding of information discussed w/ no further questions at this time.

## 2020-08-31 NOTE — TELEPHONE ENCOUNTER
Patient states he needs a call back in reference to My Chart reminders/Things to Do List. Patient states it show he needs ColoGuard & patient reports there is a Very Strong Family history of Colon Cancer & needs to discuss if a Colonoscopy would be better or if needs to do both. Patient states his mother is currently fighting Stage 4 Colon Cancer & is very concerned with making sure he is being tested. Patient also states he needs to discuss getting the Shingles Vaccination. Please call to advise.  Thank you

## 2020-10-21 ENCOUNTER — TELEPHONE (OUTPATIENT)
Dept: INTERNAL MEDICINE CLINIC | Age: 50
End: 2020-10-21

## 2020-10-21 NOTE — TELEPHONE ENCOUNTER
Patient states he needs a call back if a Sooner appt can be done than 11/4/20 with Dr. Niko Villar for Skin Tag. Patient states Virtual Visit is ok. Please call to advise if can be done sooner.  Thank you

## 2020-11-04 ENCOUNTER — VIRTUAL VISIT (OUTPATIENT)
Dept: INTERNAL MEDICINE CLINIC | Age: 50
End: 2020-11-04

## 2020-11-04 DIAGNOSIS — L91.8 SKIN TAG: Primary | ICD-10-CM

## 2020-11-04 DIAGNOSIS — Z12.5 PROSTATE CANCER SCREENING: ICD-10-CM

## 2020-11-04 DIAGNOSIS — I10 ESSENTIAL HYPERTENSION: ICD-10-CM

## 2020-11-04 PROCEDURE — 99213 OFFICE O/P EST LOW 20 MIN: CPT | Performed by: INTERNAL MEDICINE

## 2020-11-04 NOTE — PATIENT INSTRUCTIONS
This is an established visit conducted via telemedicine. The patient has been instructed that this meets HIPAA criteria and acknowledges and agrees to this method of visitation. Sonya Kilgore LPN 
17/97/38 
5:38 AM 
 
1. Have you been to the ER, urgent care clinic since your last visit? Hospitalized since your last visit? NO 
 
2. Have you seen or consulted any other health care providers outside of the [address] [place] [address] since your last visit? Include any pap smears or colon screening.  No

## 2020-11-04 NOTE — PROGRESS NOTES
HISTORY OF PRESENT ILLNESS  Rodger Suarez is a 48 y.o. male. HPI   Rodger Suarez is a 48 y.o. male being evaluated by a Virtual Visit (video visit) encounter to address concerns as mentioned above. A caregiver was present when appropriate. Due to this being a TeleHealth encounter (During PSXWL-84 public health emergency), evaluation of the following organ systems was limited: Vitals/Constitutional/EENT/Resp/CV/GI//MS/Neuro/Skin/Heme-Lymph-Imm. Pursuant to the emergency declaration under the [facility], [address] [place] [address] authority and the [employer] Act, this Virtual Visit was conducted with patient's (and/or legal guardian's) consent, to reduce the risk of exposure to COVID-19 and provide necessary medical care. Services were provided through a video synchronous discussion virtually to substitute for in-person encounter. --Sheyla Plata MD on 11/4/2020 at 7:14 AM    An electronic signature was used to authenticate this note. Here for acute care OV   c/o skin tag enlargig right medial thigh    Reports bp has been controlled    Did not get labs done earlier this year   bp 134/88 today  Stressed about his mother in ICU with colon cancer    Last OV  F/u HTN and HLD  Microscopic hematuria and FH colon cancer   Low testosterone level 235 last OV with sxs of fatigue--repeat was recommended     Saw GI Dr Elda Moreno for gas pain in left lower abdomen--no procedure recommended       Patient Active Problem List    Diagnosis Date Noted    FH: colon cancer 07/24/2019    Panic disorder 11/11/2010    Essential hypertension, benign 05/13/2010    Microscopic hematuria 05/13/2010    Gynecomastia, male 05/13/2010    Pure hypercholesterolemia 05/13/2010     Current Outpatient Medications   Medication Sig Dispense Refill    lisinopril-hydroCHLOROthiazide (PRINZIDE, ZESTORETIC) 20-12.5 mg per tablet Take 1 Tab by mouth daily.  90 Tab 3    FA/MV,CA,FE,MIN/LYCOPENE/LUT (MULTIVITAL PO) Take  by mouth.  fluticasone furoate-vilanterol (BREO ELLIPTA) 200-25 mcg/dose inhaler Take 1 Puff by inhalation daily. 1 Inhaler 0     No Known Allergies   Lab Results   Component Value Date/Time    WBC 6.0 07/25/2019 10:14 AM    HGB 14.6 07/25/2019 10:14 AM    HCT 43.8 07/25/2019 10:14 AM    PLATELET 844 04/27/4803 10:14 AM    MCV 88 07/25/2019 10:14 AM     Lab Results   Component Value Date/Time    Cholesterol, total 178 07/25/2019 10:14 AM    HDL Cholesterol 54 07/25/2019 10:14 AM    LDL, calculated 102 (H) 07/25/2019 10:14 AM    Triglyceride 110 07/25/2019 10:14 AM        ROS    Physical Exam  Constitutional:       Appearance: Normal appearance. Pulmonary:      Effort: Pulmonary effort is normal.   Skin:     Comments: Large skin tag right medial thigh   Neurological:      General: No focal deficit present. Mental Status: He is alert. ASSESSMENT and PLAN  Diagnoses and all orders for this visit:    1. Skin tag  -     REFERRAL TO GENERAL SURGERY    2. Essential hypertension  -     CBC W/O DIFF  -     METABOLIC PANEL, COMPREHENSIVE  -     LIPID PANEL   bp monitroing on lis/hct  3.  Prostate cancer screening  -     PSA W/ REFLX FREE PSA        rtc 6 months CPE

## 2020-11-13 ENCOUNTER — OFFICE VISIT (OUTPATIENT)
Dept: SURGERY | Age: 50
End: 2020-11-13
Payer: COMMERCIAL

## 2020-11-13 VITALS
SYSTOLIC BLOOD PRESSURE: 123 MMHG | DIASTOLIC BLOOD PRESSURE: 83 MMHG | OXYGEN SATURATION: 97 % | WEIGHT: 196 LBS | RESPIRATION RATE: 16 BRPM | BODY MASS INDEX: 25.98 KG/M2 | HEART RATE: 73 BPM | HEIGHT: 73 IN | TEMPERATURE: 97.2 F

## 2020-11-13 DIAGNOSIS — L91.8 SKIN TAG: Primary | ICD-10-CM

## 2020-11-13 PROCEDURE — 99203 OFFICE O/P NEW LOW 30 MIN: CPT | Performed by: SURGERY

## 2020-11-13 PROCEDURE — 11200 RMVL SKIN TAGS UP TO&INC 15: CPT | Performed by: SURGERY

## 2020-11-13 NOTE — Clinical Note
12/7/20 Patient: Jordyn Astudillo YOB: 1970 Date of Visit: 11/13/2020 Monica Myers MD 
[address] [facility] [address] VIA In Basket Dear Monica Myers MD, Thank you for referring Mr. Kuldip Webber to [address] [place] [address] for evaluation. My notes for this consultation are attached. If you have questions, please do not hesitate to call me. I look forward to following your patient along with you.  
 
 
Sincerely, 
 
Mirlande Nj MD

## 2020-11-13 NOTE — PROGRESS NOTES
Identified pt with two pt identifiers(name and ). Reviewed record in preparation for visit and have obtained necessary documentation. Chief Complaint   Patient presents with    New Patient     discuss skin tag, on right side inner thigh         Health Maintenance Due   Topic    Shingrix Vaccine Age 50> (1 of 2)    Flu Vaccine (1)       Visit Vitals  /83   Pulse 73   Temp 97.2 °F (36.2 °C) (Temporal)   Resp 16   Ht 6' 1\" (1.854 m)   Wt 196 lb (88.9 kg)   SpO2 97%   BMI 25.86 kg/m²         Coordination of Care Questionnaire:  :   1) Have you been to an emergency room, urgent care, or hospitalized since your last visit? If yes, where when, and reason for visit? no       2. Have seen or consulted any other health care provider since your last visit? If yes, where when, and reason for visit? NO      Patient is accompanied by self I have received verbal consent from [facility] to discuss any/all medical information while they are present in the room. 0

## 2020-11-14 LAB
ALBUMIN SERPL-MCNC: 4.4 G/DL (ref 4–5)
ALBUMIN/GLOB SERPL: 1.5 {RATIO} (ref 1.2–2.2)
ALP SERPL-CCNC: 59 IU/L (ref 39–117)
ALT SERPL-CCNC: 21 IU/L (ref 0–44)
AST SERPL-CCNC: 21 IU/L (ref 0–40)
BILIRUB SERPL-MCNC: 0.5 MG/DL (ref 0–1.2)
BUN SERPL-MCNC: 11 MG/DL (ref 6–24)
BUN/CREAT SERPL: 11 (ref 9–20)
CALCIUM SERPL-MCNC: 9.9 MG/DL (ref 8.7–10.2)
CHLORIDE SERPL-SCNC: 99 MMOL/L (ref 96–106)
CHOLEST SERPL-MCNC: 188 MG/DL (ref 100–199)
CO2 SERPL-SCNC: 28 MMOL/L (ref 20–29)
CREAT SERPL-MCNC: 1.03 MG/DL (ref 0.76–1.27)
ERYTHROCYTE [DISTWIDTH] IN BLOOD BY AUTOMATED COUNT: 12.1 % (ref 11.6–15.4)
GLOBULIN SER CALC-MCNC: 3 G/DL (ref 1.5–4.5)
GLUCOSE SERPL-MCNC: 89 MG/DL (ref 65–99)
HCT VFR BLD AUTO: 43.6 % (ref 37.5–51)
HDLC SERPL-MCNC: 55 MG/DL
HGB BLD-MCNC: 14.6 G/DL (ref 13–17.7)
LDLC SERPL CALC-MCNC: 120 MG/DL (ref 0–99)
MCH RBC QN AUTO: 29.1 PG (ref 26.6–33)
MCHC RBC AUTO-ENTMCNC: 33.5 G/DL (ref 31.5–35.7)
MCV RBC AUTO: 87 FL (ref 79–97)
PLATELET # BLD AUTO: 241 X10E3/UL (ref 150–450)
POTASSIUM SERPL-SCNC: 4.1 MMOL/L (ref 3.5–5.2)
PROT SERPL-MCNC: 7.4 G/DL (ref 6–8.5)
PSA SERPL-MCNC: 0.8 NG/ML (ref 0–4)
RBC # BLD AUTO: 5.01 X10E6/UL (ref 4.14–5.8)
REFLEX CRITERIA: NORMAL
SODIUM SERPL-SCNC: 139 MMOL/L (ref 134–144)
TRIGL SERPL-MCNC: 68 MG/DL (ref 0–149)
VLDLC SERPL CALC-MCNC: 13 MG/DL (ref 5–40)
WBC # BLD AUTO: 5.4 X10E3/UL (ref 3.4–10.8)

## 2020-11-23 LAB
CPT CODES, 490044: NORMAL
CPT DISCLAIMER: NORMAL
CYTOLOGY SPEC DOC CYTO: NORMAL
DIAGNOSIS SYNOPSIS:: NORMAL
DX ICD CODE: NORMAL
DX ICD CODE: NORMAL
PATH REPORT.GROSS SPEC: NORMAL
PATH REPORT.MICROSCOPIC SPEC OTHER STN: NORMAL
PATHOLOGIST NAME: NORMAL
PDF IMAGE, 807507: NORMAL
SPECIMEN SOURCE: NORMAL

## 2020-12-07 NOTE — PROGRESS NOTES
To: Gurpreet Contreras MD    From: Ramu Giles MD    Thank you for sending Rambo Richardson to see us. Please note that this dictation was completed with [employer], the computer voice recognition software. Quite often unanticipated grammatical, syntax, homophones, and other interpretive errors are inadvertently transcribed by the software. Please disregard these errors. Please excuse any errors that have escaped final proofreading. Encounter Date: 11/13/2020  History and Physical    Assessment:   Inflamed large pedunculated skin lesion in the right upper inner thigh. No anticoagulation. Plan:   I recommended excisional biopsy. Risks including bleeding and infection were explained to the patient. The patient expressed understanding of the risks, and all questions were answered to the patient's satisfaction. HPI:   Cristofer Etienne is a 48 y.o. male who is seen in consultation at the request of Gurpreet Contreras MD for evaluation of a tender skin tag on his right upper inner thigh. Is been there for some time but has recently become traumatized and got much larger and more painful. There is been a little bit of bloody discharge. Past Medical History:   Diagnosis Date    Hypertension      History reviewed. No pertinent surgical history. Family History   Problem Relation Age of Onset    Hypertension Mother     Colon Cancer Mother     Hypertension Father     Cancer Father 61        prostate cancer    Cancer Maternal Uncle         prostate cancer    Cancer Maternal Grandfather         prostate cancer     Social History     Tobacco Use    Smoking status: Never Smoker    Smokeless tobacco: Never Used   Substance Use Topics    Alcohol use: Yes     Comment: occasional      Current Outpatient Medications   Medication Sig    lisinopril-hydroCHLOROthiazide (PRINZIDE, ZESTORETIC) 20-12.5 mg per tablet Take 1 Tab by mouth daily.  FA/MV,CA,FE,MIN/LYCOPENE/LUT (MULTIVITAL PO) Take  by mouth.     fluticasone furoate-vilanterol (BREO ELLIPTA) 200-25 mcg/dose inhaler Take 1 Puff by inhalation daily. (Patient not taking: Reported on 11/13/2020)     No current facility-administered medications for this visit. Allergies:  No Known Allergies    Review of Systems:  10 systems reviewed. See scanned sheet in \"Media\" section. See HPI for pertinent positives and negatives. Objective:     Visit Vitals  /83   Pulse 73   Temp 97.2 °F (36.2 °C) (Temporal)   Resp 16   Ht 6' 1\" (1.854 m)   Wt 88.9 kg (196 lb)   SpO2 97%   BMI 25.86 kg/m²       Physical Exam:  General appearance  Alert, cooperative, no distress, appears stated age   [de-identified] Anicteric           Lungs   Clear to auscultation bilaterally   Heart  Regular rate and rhythm. Abdomen   Soft, non-tender. Extremities no cyanosis or edema   Pulses 2+ right radial       Lymph nodes No palpable inguinal LAD. Neurologic Without overt sensory or motor deficit     Focused exam of the right thigh reveals a traumatized skin tag approximately 1 cm in size. There is no surrounding erythema. .    Excision Procedure Note    Procedure Date: 11/13/2020    Pre-operative diagnosis: Right upper inner thigh traumatized skin tag  Post-operative diagnosis: Same  Procedure:  Excision of above    Specimen size: 1 cm     Time out at performed by me (see consent form):  * Patient was identified by name and date of birth   * Agreement on procedure being performed was verified  * Risks and Benefits explained to the patient  * Procedure site verified and marked as necessary  * Patient was positioned for comfort  * Consent was signed and verified    Anesthesia: Local    Procedure Details: The area was prepped and draped in the usual manner. Local anesthesia was infiltrated into the skin and soft tissues surrounding the lesion. The lesion was elevated with a grasper and it was excised below the level of grossly normal skin.   This left a 5 mm [place] of exposed dermis that was coagulated with silver nitrate and dressed with a Band-Aid. The patient tolerated the procedure well. The specimen was passed off for pathology. Estimated Blood Loss:  minimal        Disposition:   Post-procedure pain scale: 0/10.        Signed By:   Paris Castillo MD

## 2021-05-03 ENCOUNTER — OFFICE VISIT (OUTPATIENT)
Dept: INTERNAL MEDICINE CLINIC | Age: 51
End: 2021-05-03
Payer: COMMERCIAL

## 2021-05-03 VITALS
BODY MASS INDEX: 26.24 KG/M2 | HEIGHT: 73 IN | OXYGEN SATURATION: 99 % | TEMPERATURE: 96.3 F | RESPIRATION RATE: 16 BRPM | DIASTOLIC BLOOD PRESSURE: 87 MMHG | HEART RATE: 74 BPM | SYSTOLIC BLOOD PRESSURE: 132 MMHG | WEIGHT: 198 LBS

## 2021-05-03 DIAGNOSIS — R79.89 LOW TESTOSTERONE IN MALE: ICD-10-CM

## 2021-05-03 DIAGNOSIS — Z80.0 FH: COLON CANCER: ICD-10-CM

## 2021-05-03 DIAGNOSIS — I10 ESSENTIAL HYPERTENSION: Primary | ICD-10-CM

## 2021-05-03 PROCEDURE — 99213 OFFICE O/P EST LOW 20 MIN: CPT | Performed by: INTERNAL MEDICINE

## 2021-05-03 NOTE — PATIENT INSTRUCTIONS
Office Policies    Phone calls/patient messages:            Please allow up to 24 hours for someone in the office to contact you about your call or message. Be mindful your provider may be out of the office or your message may require further review. We encourage you to use [employer] for your messages as this is a faster, more efficient way to communicate with our office                         Medication Refills:            Prescription medications require 48-72 business hours to process. We encourage you to use [employer] for your refills. For controlled medications: Please allow 72 business hours to process. Certain medications may require you to  a written prescription at our office. NO narcotic/controlled medications will be prescribed after 4pm Monday through Friday or on weekends              Form/Paperwork Completion:            Please note a $25 fee may incur for all paperwork for completed by our providers. We ask that you allow 7-10 business days. Pre-payment is due prior to picking up/faxing the completed form. You may also download your forms to [employer] to have your doctor print off.

## 2021-05-03 NOTE — PROGRESS NOTES
HISTORY OF PRESENT ILLNESS  Roma Hester is a 46 y.o. male. HPI   F/u HTN and HLD  Microscopic hematuria and FH colon cancer and CPE  Feels ok physically   Stable weight since last O/V  Has low testosterone which he believes affects his mood somedays with irritibility every few days but denies ED , lack of libido or fatigue  Last OV       Here for acute care OV   c/o skin tag enlarging  right medial thigh     Reports bp has been controlled     Did not get labs done earlier this year   bp 134/88 today  Stressed about his mother in ICU with colon cancer     Last OV    Low testosterone level 235 last OV with sxs of fatigue--repeat was recommended     Saw GI Dr Rosemary Smith for gas pain in left lower abdomen--no procedure recommended      Patient Active Problem List    Diagnosis Date Noted    FH: colon cancer 07/24/2019    Panic disorder 11/11/2010    Essential hypertension, benign 05/13/2010    Microscopic hematuria 05/13/2010    Gynecomastia, male 05/13/2010    Pure hypercholesterolemia 05/13/2010     Current Outpatient Medications   Medication Sig Dispense Refill    lisinopril-hydroCHLOROthiazide (PRINZIDE, ZESTORETIC) 20-12.5 mg per tablet Take 1 Tab by mouth daily. 90 Tab 3    fluticasone furoate-vilanterol (BREO ELLIPTA) 200-25 mcg/dose inhaler Take 1 Puff by inhalation daily. (Patient not taking: Reported on 11/13/2020) 1 Inhaler 0    FA/MV,CA,FE,MIN/LYCOPENE/LUT (MULTIVITAL PO) Take  by mouth.        No Known Allergies  Social History     Tobacco Use    Smoking status: Never Smoker    Smokeless tobacco: Never Used   Substance Use Topics    Alcohol use: Not Currently     Comment: occasional      Lab Results   Component Value Date/Time    WBC 5.4 11/13/2020 09:51 AM    HGB 14.6 11/13/2020 09:51 AM    HCT 43.6 11/13/2020 09:51 AM    PLATELET 656 05/97/9631 09:51 AM    MCV 87 11/13/2020 09:51 AM     Lab Results   Component Value Date/Time    Glucose 89 11/13/2020 09:51 AM    LDL, calculated 120 (H) 11/13/2020 09:51 AM    LDL, calculated 102 (H) 07/25/2019 10:14 AM    Creatinine 1.03 11/13/2020 09:51 AM      Lab Results   Component Value Date/Time    Cholesterol, total 188 11/13/2020 09:51 AM    HDL Cholesterol 55 11/13/2020 09:51 AM    LDL, calculated 120 (H) 11/13/2020 09:51 AM    LDL, calculated 102 (H) 07/25/2019 10:14 AM    Triglyceride 68 11/13/2020 09:51 AM     Lab Results   Component Value Date/Time    GFR est non-AA 84 11/13/2020 09:51 AM    GFR est AA 97 11/13/2020 09:51 AM    Creatinine 1.03 11/13/2020 09:51 AM    BUN 11 11/13/2020 09:51 AM    Sodium 139 11/13/2020 09:51 AM    Potassium 4.1 11/13/2020 09:51 AM    Chloride 99 11/13/2020 09:51 AM    CO2 28 11/13/2020 09:51 AM        ROS    Physical Exam  Vitals signs and nursing note reviewed. Constitutional:       General: He is not in acute distress. Appearance: He is well-developed. Comments: Appears stated age   HENT:      Head: Normocephalic. Cardiovascular:      Rate and Rhythm: Normal rate and regular rhythm. Heart sounds: Normal heart sounds. Pulmonary:      Effort: Pulmonary effort is normal.      Breath sounds: Normal breath sounds. Abdominal:      Palpations: Abdomen is soft. Neurological:      Mental Status: He is alert. ASSESSMENT and PLAN  Diagnoses and all orders for this visit:    1. Essential hypertension   controlled  2. Low testosterone in male   Pt does not wish to start on replacement medication at this time   Declines SSRI medication for mood. 3. FH: colon cancer   UTD colonoscopy    Follow-up and Dispositions    · Return in about 6 months (around 11/3/2021) for cpe x 30 minutes.

## 2021-10-03 DIAGNOSIS — I10 ESSENTIAL HYPERTENSION, BENIGN: ICD-10-CM

## 2021-10-03 RX ORDER — LISINOPRIL AND HYDROCHLOROTHIAZIDE 12.5; 2 MG/1; MG/1
TABLET ORAL
Qty: 90 TABLET | Refills: 4 | Status: SHIPPED | OUTPATIENT
Start: 2021-10-03

## 2021-11-15 ENCOUNTER — OFFICE VISIT (OUTPATIENT)
Dept: INTERNAL MEDICINE CLINIC | Age: 51
End: 2021-11-15

## 2021-11-15 VITALS
HEART RATE: 91 BPM | RESPIRATION RATE: 16 BRPM | SYSTOLIC BLOOD PRESSURE: 110 MMHG | HEIGHT: 73 IN | BODY MASS INDEX: 26.64 KG/M2 | OXYGEN SATURATION: 98 % | TEMPERATURE: 97 F | WEIGHT: 201 LBS | DIASTOLIC BLOOD PRESSURE: 80 MMHG

## 2021-11-15 DIAGNOSIS — Z00.00 ROUTINE GENERAL MEDICAL EXAMINATION AT A HEALTH CARE FACILITY: Primary | ICD-10-CM

## 2021-11-15 DIAGNOSIS — Z23 NEEDS FLU SHOT: ICD-10-CM

## 2021-11-15 DIAGNOSIS — Z23 ENCOUNTER FOR IMMUNIZATION: ICD-10-CM

## 2021-11-15 PROCEDURE — 99396 PREV VISIT EST AGE 40-64: CPT | Performed by: INTERNAL MEDICINE

## 2021-11-15 PROCEDURE — 90472 IMMUNIZATION ADMIN EACH ADD: CPT | Performed by: INTERNAL MEDICINE

## 2021-11-15 PROCEDURE — 90471 IMMUNIZATION ADMIN: CPT | Performed by: INTERNAL MEDICINE

## 2021-11-15 PROCEDURE — 90686 IIV4 VACC NO PRSV 0.5 ML IM: CPT | Performed by: INTERNAL MEDICINE

## 2021-11-15 PROCEDURE — 90750 HZV VACC RECOMBINANT IM: CPT | Performed by: INTERNAL MEDICINE

## 2021-11-15 NOTE — PROGRESS NOTES
Nallely Perez is a 46 y.o. male who presents for routine immunizations. He denies any symptoms , reactions or allergies that would exclude them from being immunized today. Risks and adverse reactions were discussed and the VIS was given to them. All questions were addressed. He was observed for 20 min post injection. There were no reactions observed.     Magda Bell LPN

## 2021-11-15 NOTE — PATIENT INSTRUCTIONS
Office Policies    Phone calls/patient messages:            Please allow up to 24 hours for someone in the office to contact you about your call or message. Be mindful your provider may be out of the office or your message may require further review. We encourage you to use [employer] for your messages as this is a faster, more efficient way to communicate with our office                         Medication Refills:            Prescription medications require 48-72 business hours to process. We encourage you to use [employer] for your refills. For controlled medications: Please allow 72 business hours to process. Certain medications may require you to  a written prescription at our office. NO narcotic/controlled medications will be prescribed after 4pm Monday through Friday or on weekends              Form/Paperwork Completion:            Please note a $25 fee may incur for all paperwork for completed by our providers. We ask that you allow 7-10 business days. Pre-payment is due prior to picking up/faxing the completed form. You may also download your forms to [employer] to have your doctor print off.

## 2021-11-15 NOTE — PROGRESS NOTES
HISTORY OF PRESENT ILLNESS  Ravi Mirza is a 46 y.o. male. HPI     F/u HTN and HLD  Microscopic hematuria and FH colon cancer and CPE  Eating more fibre in diet  Not exercising as  much-weight up a few lbs  Feels well overal  Adherent with medicines  Last OV    Feels ok physically   Stable weight since last O/V  Has low testosterone which he believes affects his mood somedays with irritibility every few days but denies ED , lack of libido or fatigue    Patient Active Problem List    Diagnosis Date Noted    Low testosterone in male 05/03/2021    FH: colon cancer 07/24/2019    Panic disorder 11/11/2010    Essential hypertension, benign 05/13/2010    Microscopic hematuria 05/13/2010    Gynecomastia, male 05/13/2010    Pure hypercholesterolemia 05/13/2010     Current Outpatient Medications   Medication Sig Dispense Refill    lisinopril-hydroCHLOROthiazide (PRINZIDE, ZESTORETIC) 20-12.5 mg per tablet TAKE 1 TABLET BY MOUTH EVERY DAY 90 Tablet 4    FA/MV,CA,FE,MIN/LYCOPENE/LUT (MULTIVITAL PO) Take  by mouth.  fluticasone furoate-vilanterol (BREO ELLIPTA) 200-25 mcg/dose inhaler Take 1 Puff by inhalation daily.  (Patient not taking: Reported on 11/13/2020) 1 Inhaler 0     No Known Allergies   Lab Results   Component Value Date/Time    WBC 5.4 11/13/2020 09:51 AM    HGB 14.6 11/13/2020 09:51 AM    HCT 43.6 11/13/2020 09:51 AM    PLATELET 252 47/85/8998 09:51 AM    MCV 87 11/13/2020 09:51 AM     Lab Results   Component Value Date/Time    Glucose 89 11/13/2020 09:51 AM    LDL, calculated 120 (H) 11/13/2020 09:51 AM    LDL, calculated 102 (H) 07/25/2019 10:14 AM    Creatinine 1.03 11/13/2020 09:51 AM      Lab Results   Component Value Date/Time    Cholesterol, total 188 11/13/2020 09:51 AM    HDL Cholesterol 55 11/13/2020 09:51 AM    LDL, calculated 120 (H) 11/13/2020 09:51 AM    LDL, calculated 102 (H) 07/25/2019 10:14 AM    Triglyceride 68 11/13/2020 09:51 AM     Lab Results   Component Value Date/Time    GFR est non-AA 84 11/13/2020 09:51 AM    GFR est AA 97 11/13/2020 09:51 AM    Creatinine 1.03 11/13/2020 09:51 AM    BUN 11 11/13/2020 09:51 AM    Sodium 139 11/13/2020 09:51 AM    Potassium 4.1 11/13/2020 09:51 AM    Chloride 99 11/13/2020 09:51 AM    CO2 28 11/13/2020 09:51 AM        Review of Systems   Constitutional: Negative for chills, fever, malaise/fatigue and weight loss. HENT: Negative. Eyes: Negative. Negative for blurred vision and double vision. Respiratory: Negative for cough and shortness of breath. Cardiovascular: Negative for chest pain and palpitations. Gastrointestinal: Negative for abdominal pain, blood in stool, constipation, diarrhea, melena, nausea and vomiting. Genitourinary: Negative for dysuria, frequency, hematuria and urgency. Musculoskeletal: Negative for back pain, falls, joint pain and myalgias. Neurological: Negative for dizziness, tremors and headaches. Endo/Heme/Allergies: Negative. Psychiatric/Behavioral: Negative. Physical Exam  Vitals and nursing note reviewed. Constitutional:       General: He is not in acute distress. Appearance: Normal appearance. He is well-developed. Comments: Appears stated age   HENT:      Head: Normocephalic. Right Ear: Tympanic membrane normal.      Mouth/Throat:      Mouth: Mucous membranes are moist.   Eyes:      Pupils: Pupils are equal, round, and reactive to light. Cardiovascular:      Rate and Rhythm: Normal rate and regular rhythm. Heart sounds: Normal heart sounds. Pulmonary:      Effort: Pulmonary effort is normal.      Breath sounds: Normal breath sounds. Abdominal:      Palpations: Abdomen is soft. Musculoskeletal:         General: Normal range of motion. Cervical back: Normal range of motion. Skin:     General: Skin is warm. Comments: Birthmark on lower back   Neurological:      Mental Status: He is alert.    Psychiatric:         Mood and Affect: Mood normal.         Thought Content: Thought content normal.         Judgment: Judgment normal.         ASSESSMENT and PLAN  Diagnoses and all orders for this visit:    1. Routine general medical examination at a health care facility  -     HEPATITIS C AB; Future  -     CBC W/O DIFF; Future  -     METABOLIC PANEL, COMPREHENSIVE; Future  -     LIPID PANEL; Future  -     TSH 3RD GENERATION; Future  -     PSA W/ REFLX FREE PSA; Future   Continue healthy lifestyle but advised to work on diet and exercise and weight reduction  2. Needs flu shot  -     INFLUENZA VIRUS VAC QUAD,SPLIT,PRESV FREE SYRINGE IM    3. Encounter for immunization  -     ZOSTER VACC RECOMBINANT ADJUVANTED    4. HTN   Good control    5. HLD   Mild-try to manage with diet and exercise  Follow-up and Dispositions    · Return in about 1 year (around 11/15/2022) for CPE.

## 2021-11-16 LAB
ALBUMIN SERPL-MCNC: 4.4 G/DL (ref 3.5–5)
ALBUMIN/GLOB SERPL: 1.2 {RATIO} (ref 1.1–2.2)
ALP SERPL-CCNC: 51 U/L (ref 45–117)
ALT SERPL-CCNC: 37 U/L (ref 12–78)
ANION GAP SERPL CALC-SCNC: 3 MMOL/L (ref 5–15)
AST SERPL-CCNC: 15 U/L (ref 15–37)
BILIRUB SERPL-MCNC: 0.7 MG/DL (ref 0.2–1)
BUN SERPL-MCNC: 17 MG/DL (ref 6–20)
BUN/CREAT SERPL: 16 (ref 12–20)
CALCIUM SERPL-MCNC: 9.9 MG/DL (ref 8.5–10.1)
CHLORIDE SERPL-SCNC: 102 MMOL/L (ref 97–108)
CHOLEST SERPL-MCNC: 202 MG/DL
CO2 SERPL-SCNC: 32 MMOL/L (ref 21–32)
CREAT SERPL-MCNC: 1.07 MG/DL (ref 0.7–1.3)
ERYTHROCYTE [DISTWIDTH] IN BLOOD BY AUTOMATED COUNT: 12.2 % (ref 11.5–14.5)
GLOBULIN SER CALC-MCNC: 3.7 G/DL (ref 2–4)
GLUCOSE SERPL-MCNC: 80 MG/DL (ref 65–100)
HCT VFR BLD AUTO: 46.4 % (ref 36.6–50.3)
HCV AB SERPL QL IA: NONREACTIVE
HDLC SERPL-MCNC: 61 MG/DL
HDLC SERPL: 3.3 {RATIO} (ref 0–5)
HGB BLD-MCNC: 15.2 G/DL (ref 12.1–17)
LDLC SERPL CALC-MCNC: 124.6 MG/DL (ref 0–100)
MCH RBC QN AUTO: 29.6 PG (ref 26–34)
MCHC RBC AUTO-ENTMCNC: 32.8 G/DL (ref 30–36.5)
MCV RBC AUTO: 90.4 FL (ref 80–99)
NRBC # BLD: 0 K/UL (ref 0–0.01)
NRBC BLD-RTO: 0 PER 100 WBC
PLATELET # BLD AUTO: 239 K/UL (ref 150–400)
PMV BLD AUTO: 10.8 FL (ref 8.9–12.9)
POTASSIUM SERPL-SCNC: 3.9 MMOL/L (ref 3.5–5.1)
PROT SERPL-MCNC: 8.1 G/DL (ref 6.4–8.2)
RBC # BLD AUTO: 5.13 M/UL (ref 4.1–5.7)
SODIUM SERPL-SCNC: 137 MMOL/L (ref 136–145)
TRIGL SERPL-MCNC: 82 MG/DL (ref ?–150)
TSH SERPL DL<=0.05 MIU/L-ACNC: 1.12 UIU/ML (ref 0.36–3.74)
VLDLC SERPL CALC-MCNC: 16.4 MG/DL
WBC # BLD AUTO: 6.9 K/UL (ref 4.1–11.1)

## 2021-11-17 LAB
PSA SERPL-MCNC: 1.1 NG/ML (ref 0–4)
REFLEX CRITERIA: NORMAL

## 2022-03-18 PROBLEM — R79.89 LOW TESTOSTERONE IN MALE: Status: ACTIVE | Noted: 2021-05-03

## 2022-03-19 PROBLEM — Z80.0 FH: COLON CANCER: Status: ACTIVE | Noted: 2019-07-24

## 2022-10-02 ENCOUNTER — HOSPITAL ENCOUNTER (EMERGENCY)
Age: 52
Discharge: HOME | End: 2022-10-02
Attending: EMERGENCY MEDICINE
Payer: COMMERCIAL

## 2022-10-02 ENCOUNTER — APPOINTMENT (OUTPATIENT)
Dept: CT IMAGING | Age: 52
End: 2022-10-02
Attending: EMERGENCY MEDICINE
Payer: COMMERCIAL

## 2022-10-02 VITALS
OXYGEN SATURATION: 98 % | HEART RATE: 84 BPM | SYSTOLIC BLOOD PRESSURE: 129 MMHG | BODY MASS INDEX: 26.79 KG/M2 | WEIGHT: 202.16 LBS | DIASTOLIC BLOOD PRESSURE: 83 MMHG | RESPIRATION RATE: 16 BRPM | HEIGHT: 73 IN | TEMPERATURE: 98.1 F

## 2022-10-02 DIAGNOSIS — K52.9 COLITIS: Primary | ICD-10-CM

## 2022-10-02 LAB
ALBUMIN SERPL-MCNC: 3.9 G/DL (ref 3.5–5)
ALBUMIN/GLOB SERPL: 1.1 {RATIO} (ref 1.1–2.2)
ALP SERPL-CCNC: 53 U/L (ref 45–117)
ALT SERPL-CCNC: 29 U/L (ref 12–78)
ANION GAP SERPL CALC-SCNC: 5 MMOL/L (ref 5–15)
APPEARANCE UR: CLEAR
AST SERPL-CCNC: 19 U/L (ref 15–37)
BACTERIA URNS QL MICRO: NEGATIVE /HPF
BASOPHILS # BLD: 0 K/UL (ref 0–0.1)
BASOPHILS NFR BLD: 1 % (ref 0–1)
BILIRUB SERPL-MCNC: 0.6 MG/DL (ref 0.2–1)
BILIRUB UR QL: NEGATIVE
BUN SERPL-MCNC: 11 MG/DL (ref 6–20)
BUN/CREAT SERPL: 9 (ref 12–20)
CALCIUM SERPL-MCNC: 9 MG/DL (ref 8.5–10.1)
CHLORIDE SERPL-SCNC: 99 MMOL/L (ref 97–108)
CO2 SERPL-SCNC: 33 MMOL/L (ref 21–32)
COLOR UR: ABNORMAL
CREAT SERPL-MCNC: 1.2 MG/DL (ref 0.7–1.3)
DIFFERENTIAL METHOD BLD: NORMAL
EOSINOPHIL # BLD: 0.2 K/UL (ref 0–0.4)
EOSINOPHIL NFR BLD: 3 % (ref 0–7)
EPITH CASTS URNS QL MICRO: ABNORMAL /LPF
ERYTHROCYTE [DISTWIDTH] IN BLOOD BY AUTOMATED COUNT: 12.4 % (ref 11.5–14.5)
GLOBULIN SER CALC-MCNC: 3.6 G/DL (ref 2–4)
GLUCOSE SERPL-MCNC: 95 MG/DL (ref 65–100)
GLUCOSE UR STRIP.AUTO-MCNC: NEGATIVE MG/DL
HCT VFR BLD AUTO: 43.6 % (ref 36.6–50.3)
HGB BLD-MCNC: 14.5 G/DL (ref 12.1–17)
HGB UR QL STRIP: ABNORMAL
IMM GRANULOCYTES # BLD AUTO: 0 K/UL (ref 0–0.04)
IMM GRANULOCYTES NFR BLD AUTO: 0 % (ref 0–0.5)
KETONES UR QL STRIP.AUTO: NEGATIVE MG/DL
LEUKOCYTE ESTERASE UR QL STRIP.AUTO: NEGATIVE
LIPASE SERPL-CCNC: 69 U/L (ref 73–393)
LYMPHOCYTES # BLD: 2.1 K/UL (ref 0.8–3.5)
LYMPHOCYTES NFR BLD: 36 % (ref 12–49)
MCH RBC QN AUTO: 29.2 PG (ref 26–34)
MCHC RBC AUTO-ENTMCNC: 33.3 G/DL (ref 30–36.5)
MCV RBC AUTO: 87.9 FL (ref 80–99)
MONOCYTES # BLD: 0.5 K/UL (ref 0–1)
MONOCYTES NFR BLD: 9 % (ref 5–13)
MUCOUS THREADS URNS QL MICRO: ABNORMAL /LPF
NEUTS SEG # BLD: 2.9 K/UL (ref 1.8–8)
NEUTS SEG NFR BLD: 51 % (ref 32–75)
NITRITE UR QL STRIP.AUTO: NEGATIVE
NRBC # BLD: 0 K/UL (ref 0–0.01)
NRBC BLD-RTO: 0 PER 100 WBC
PH UR STRIP: 6 [PH] (ref 5–8)
PLATELET # BLD AUTO: 196 K/UL (ref 150–400)
PMV BLD AUTO: 9.6 FL (ref 8.9–12.9)
POTASSIUM SERPL-SCNC: 3.7 MMOL/L (ref 3.5–5.1)
PROT SERPL-MCNC: 7.5 G/DL (ref 6.4–8.2)
PROT UR STRIP-MCNC: NEGATIVE MG/DL
RBC # BLD AUTO: 4.96 M/UL (ref 4.1–5.7)
RBC #/AREA URNS HPF: ABNORMAL /HPF (ref 0–5)
SODIUM SERPL-SCNC: 137 MMOL/L (ref 136–145)
SP GR UR REFRACTOMETRY: 1.02 (ref 1–1.03)
UR CULT HOLD, URHOLD: NORMAL
UROBILINOGEN UR QL STRIP.AUTO: 0.2 EU/DL (ref 0.2–1)
WBC # BLD AUTO: 5.7 K/UL (ref 4.1–11.1)
WBC URNS QL MICRO: ABNORMAL /HPF (ref 0–4)

## 2022-10-02 PROCEDURE — 74011000636 HC RX REV CODE- 636: Performed by: EMERGENCY MEDICINE

## 2022-10-02 PROCEDURE — 99285 EMERGENCY DEPT VISIT HI MDM: CPT

## 2022-10-02 PROCEDURE — 36415 COLL VENOUS BLD VENIPUNCTURE: CPT

## 2022-10-02 PROCEDURE — 85025 COMPLETE CBC W/AUTO DIFF WBC: CPT

## 2022-10-02 PROCEDURE — 74177 CT ABD & PELVIS W/CONTRAST: CPT

## 2022-10-02 PROCEDURE — 81001 URINALYSIS AUTO W/SCOPE: CPT

## 2022-10-02 PROCEDURE — 80053 COMPREHEN METABOLIC PANEL: CPT

## 2022-10-02 PROCEDURE — 83690 ASSAY OF LIPASE: CPT

## 2022-10-02 RX ORDER — CIPROFLOXACIN 500 MG/1
500 TABLET ORAL 2 TIMES DAILY
Qty: 14 TABLET | Refills: 0 | Status: SHIPPED | OUTPATIENT
Start: 2022-10-02 | End: 2022-10-09

## 2022-10-02 RX ORDER — METRONIDAZOLE 500 MG/1
500 TABLET ORAL 2 TIMES DAILY
Qty: 14 TABLET | Refills: 0 | Status: SHIPPED | OUTPATIENT
Start: 2022-10-02 | End: 2022-10-04 | Stop reason: SDUPTHER

## 2022-10-02 RX ADMIN — IOPAMIDOL 100 ML: 755 INJECTION, SOLUTION INTRAVENOUS at 09:47

## 2022-10-02 NOTE — ED PROVIDER NOTES
59-year-old male presents the chief complaint of abdominal pain. Patient has been experiencing intermittent left lower quadrant abdominal pain for the last month. He states the pain is getting worse. He does not rate his pain on a scale. It is occasionally worse when he lays on his left side. He does not describe it but does state that it occasionally feels like there is numbness in the area. He denies abnormal bowel movements. He has not had any blood in his stool or urine. He denies dysuria or vomiting. He does have a very strong family history of colon CA. He has had normal colonoscopies in the past, the last which was in 2017. He has an upcoming colonoscopy scheduled for December 1. He was seen by urology approximately 1 week ago and had an unremarkable visit. Past Medical History:   Diagnosis Date    Hypertension        No past surgical history on file.       Family History:   Problem Relation Age of Onset    Hypertension Mother     Colon Cancer Mother     Hypertension Father     Cancer Father [address] [facility]        prostate cancer    Cancer Maternal Uncle         prostate cancer    Cancer Maternal Grandfather         prostate cancer       Social History     Socioeconomic History    Marital status:      Spouse name: Not on file    Number of children: Not on file    Years of education: Not on file    Highest education level: Not on file   Occupational History    Not on file   Tobacco Use    Smoking status: Never    Smokeless tobacco: Never   Substance and Sexual Activity    Alcohol use: Not Currently     Comment: occasional    Drug use: Yes     Types: Prescription    Sexual activity: Yes     Partners: Female   Other Topics Concern    Not on file   Social History Narrative    Not on file     Social Determinants of Health     Financial Resource Strain: Not on file   Food Insecurity: Not on file   Transportation Needs: Not on file   Physical Activity: Not on file   Stress: Not on file   Social Connections: Not on file   Intimate Partner Violence: Not on file   Housing Stability: Not on file         ALLERGIES: Patient has no known allergies. Review of Systems   Constitutional:  Negative for fever. HENT:  Negative for rhinorrhea. Respiratory:  Negative for cough. Cardiovascular:  Negative for chest pain. Gastrointestinal:  Positive for abdominal pain. Genitourinary:  Negative for dysuria. Musculoskeletal:  Negative for back pain. Skin:  Negative for wound. Neurological:  Negative for headaches. Psychiatric/Behavioral:  Negative for confusion. There were no vitals filed for this visit. Physical Exam  Vitals and nursing note reviewed. Constitutional:       General: He is not in acute distress. Appearance: Normal appearance. He is well-developed. He is not ill-appearing, toxic-appearing or diaphoretic. HENT:      Head: Normocephalic. Eyes:      Extraocular Movements: Extraocular movements intact. Cardiovascular:      Rate and Rhythm: Normal rate. Pulses: Normal pulses. Pulmonary:      Effort: Pulmonary effort is normal. No respiratory distress. Abdominal:      General: There is no distension. Palpations: Abdomen is soft. Tenderness: There is abdominal tenderness in the left lower quadrant. Musculoskeletal:         General: Normal range of motion. Cervical back: Normal range of motion. Skin:     General: Skin is warm and dry. Capillary Refill: Capillary refill takes less than 2 seconds. Neurological:      Mental Status: He is alert and oriented to person, place, and time. Psychiatric:         Mood and Affect: Mood normal.        MDM  Number of Diagnoses or Management Options  Colitis  Diagnosis management comments:   40-year-old male presents with left lower quadrant abdominal pain. Differentials include but not limited to colitis, diverticulitis, obstruction, colon CA, urolithiasis, pyelonephritis among others.   Labs unremarkable with normal white count, liver and renal function. CT demonstrates inflammatory changes consistent with colitis in the left lower quadrant. We will treat with Cipro and Flagyl and recommend close GI follow-up for colonoscopy. Discussed my clinical impression(s), any labs and/or radiology results with the patient. I answered any questions and addressed any concerns. Discussed the importance of following up with their primary care physician and/or specialist(s). Discussed signs or symptoms that would warrant return back to the ER for further evaluation. The patient is agreeable with discharge.            Procedures

## 2022-10-02 NOTE — DISCHARGE INSTRUCTIONS
Thank you for allowing us to provide you with medical care today. We realize that you have many choices for your emergency care needs. We thank you for choosing [facility]. Please choose us in the future for any continued health care needs. The exam and treatment you received in the Emergency Department were for an emergent problem and are not intended as complete care. It is important that you follow up with a doctor, nurse practitioner, or physician's assistant for ongoing care. If your symptoms worsen or you do not improve as expected and you are unable to reach your usual health care provider, you should return to the Emergency Department. We are available 24 hours a day. Please make an appointment with your health care provider(s) for follow up of your Emergency Department visit. Take this sheet with you when you go to your follow-up visit.

## 2022-10-02 NOTE — ED TRIAGE NOTES
Patient arrives to the ED with chief complaint of LLQ abdominal pain onset 1 month ago. States he feels the pain during the day but it is worse at night with shooting pains towards his back. Was evaluated at [facility] at 2 weeks ago and urologist 1 week ago.

## 2022-10-02 NOTE — ED NOTES
Patient given discharge papers and instructions by this RN. Patient verbalized understanding and stated not having questions or concerns regarding his care. Patient ambulatory out of ED in no new acute distress. Silver Nitrate Text: The wound bed was treated with silver nitrate after the biopsy was performed.

## 2022-10-04 ENCOUNTER — VIRTUAL VISIT (OUTPATIENT)
Dept: INTERNAL MEDICINE CLINIC | Age: 52
End: 2022-10-04
Payer: COMMERCIAL

## 2022-10-04 DIAGNOSIS — K52.9 COLITIS: ICD-10-CM

## 2022-10-04 DIAGNOSIS — R10.32 LLQ PAIN: Primary | ICD-10-CM

## 2022-10-04 PROCEDURE — 99213 OFFICE O/P EST LOW 20 MIN: CPT | Performed by: INTERNAL MEDICINE

## 2022-10-04 RX ORDER — CIPROFLOXACIN 500 MG/1
500 TABLET ORAL 2 TIMES DAILY
Qty: 14 TABLET | Refills: 0 | Status: SHIPPED | OUTPATIENT
Start: 2022-10-04

## 2022-10-04 RX ORDER — METRONIDAZOLE 500 MG/1
500 TABLET ORAL 3 TIMES DAILY
Qty: 21 TABLET | Refills: 0 | Status: SHIPPED | OUTPATIENT
Start: 2022-10-04 | End: 2022-10-11

## 2022-10-04 RX ORDER — FAMOTIDINE 20 MG/1
20 TABLET, FILM COATED ORAL 2 TIMES DAILY
COMMUNITY
Start: 2022-09-17

## 2022-10-04 NOTE — PROGRESS NOTES
HISTORY OF PRESENT ILLNESS  Grover Haywood is a 46 y.o. male. HPI  Grover Haywood, was evaluated through a synchronous (real-time) audio-video encounter. The patient (or guardian if applicable) is aware that this is a billable service, which includes applicable co-pays. This Virtual Visit was conducted with patient's (and/or legal guardian's) consent. The visit was conducted pursuant to the emergency declaration under the [address] [place] [address], [address] [place] [address] authority and the [employer] Act. Patient identification was verified, and a caregiver was present when appropriate. The patient was located at: Home: [address] [place] [id number]  The provider was located at: Facility (Appt Department): [place] [id number]      --Senait Deleon MD on 10/4/2022 at 3:01 PM    An electronic signature was used to authenticate this note.      F/u HTN and HLD  Microscopic hematuria and FH colon cancer         Normal CLN 2017-Dr Paniagua  C/o LLQ and left side bloating and slight pain for about 1 month  [facility]ent to [facility] for above an bladder pressure and pain radiating to left buttocks  Dx gastritis at [facility] and placed on pepcid  Sent to URO---no UTI and KUB XR ok-no stones apparrently  Went to ER 2 d ago--CT --c/w minimal colitis descending and sigmoid colon and diverticula--cbc cmp lipase ok  Placed on cipro and flagyl-he does report some lessening of the pain since starting abx but still bloating  On schedule for 5 yr colonoscopy 12/2 for FH colon cancer  Denies blood or weight loss  Some constipation but no diarrhea    Patient Active Problem List    Diagnosis Date Noted    Low testosterone in male 05/03/2021    FH: colon cancer 07/24/2019    Panic disorder 11/11/2010    Essential hypertension, benign 05/13/2010    Microscopic hematuria 05/13/2010    Gynecomastia, male 05/13/2010    Pure hypercholesterolemia 05/13/2010     Current Outpatient Medications   Medication Sig Dispense Refill    ciprofloxacin HCl (Cipro) 500 mg tablet Take 1 Tablet by mouth two (2) times a day for 7 days. 14 Tablet 0    metroNIDAZOLE (FlagyL) 500 mg tablet Take 1 Tablet by mouth two (2) times a day for 7 days. 14 Tablet 0    lisinopril-hydroCHLOROthiazide (PRINZIDE, ZESTORETIC) 20-12.5 mg per tablet TAKE 1 TABLET BY MOUTH EVERY DAY 90 Tablet 4    FA/MV,CA,FE,MIN/LYCOPENE/LUT (MULTIVITAL PO) Take  by mouth. famotidine (PEPCID) 20 mg tablet Take 20 mg by mouth two (2) times a day. (Patient not taking: Reported on 10/4/2022)      fluticasone furoate-vilanterol (BREO ELLIPTA) 200-25 mcg/dose inhaler Take 1 Puff by inhalation daily. (Patient not taking: Reported on 11/13/2020) 1 Inhaler 0     No Known Allergies   Lab Results   Component Value Date/Time    WBC 5.7 10/02/2022 08:51 AM    HGB 14.5 10/02/2022 08:51 AM    HCT 43.6 10/02/2022 08:51 AM    PLATELET 620 59/94/9956 08:51 AM    MCV 87.9 10/02/2022 08:51 AM     Lab Results   Component Value Date/Time    ALT (SGPT) 29 10/02/2022 08:51 AM    Alk. phosphatase 53 10/02/2022 08:51 AM    Bilirubin, total 0.6 10/02/2022 08:51 AM    Albumin 3.9 10/02/2022 08:51 AM    Protein, total 7.5 10/02/2022 08:51 AM    PLATELET 300 11/71/3863 08:51 AM       Lab Results   Component Value Date/Time    GFR est non-AA >60 10/02/2022 08:51 AM    GFR est AA >60 10/02/2022 08:51 AM    Creatinine 1.20 10/02/2022 08:51 AM    BUN 11 10/02/2022 08:51 AM    Sodium 137 10/02/2022 08:51 AM    Potassium 3.7 10/02/2022 08:51 AM    Chloride 99 10/02/2022 08:51 AM    CO2 33 (H) 10/02/2022 08:51 AM        ROS    Physical Exam  Constitutional:       Appearance: Normal appearance. Pulmonary:      Effort: Pulmonary effort is normal.   Neurological:      General: No focal deficit present. Mental Status: He is alert. ASSESSMENT and PLAN    ICD-10-CM ICD-9-CM    1.  LLQ pain  R10.32 789.04 C/w mild diverticulitis  Take 10d of cipro and flagyl  Low residue diet for now then slowly advance diet in 3-5 d if feeling better  Colonoscopy as scheduled early December  To call if not improving      2.  Colitis  K52.9 558.9

## 2022-10-11 ENCOUNTER — DOCUMENTATION ONLY (OUTPATIENT)
Dept: INTERNAL MEDICINE CLINIC | Age: 52
End: 2022-10-11

## 2022-10-11 NOTE — PROGRESS NOTES
Called pt-LLQ pain improving --but having diarrhea a few times per day. Taking cipro tid and flagyl tid-instructed to lower the cipro to bid . He will call if pain returns off abx or if diarrhea does not improve.

## 2022-10-19 ENCOUNTER — DOCUMENTATION ONLY (OUTPATIENT)
Dept: INTERNAL MEDICINE CLINIC | Age: 52
End: 2022-10-19

## 2022-10-19 DIAGNOSIS — R50.9 FEVER, UNSPECIFIED FEVER CAUSE: ICD-10-CM

## 2022-10-19 DIAGNOSIS — K52.9 COLITIS: ICD-10-CM

## 2022-10-19 DIAGNOSIS — R19.5 LOOSE STOOLS: ICD-10-CM

## 2022-10-19 DIAGNOSIS — K57.92 DIVERTICULITIS: Primary | ICD-10-CM

## 2022-10-20 ENCOUNTER — LAB ONLY (OUTPATIENT)
Dept: INTERNAL MEDICINE CLINIC | Age: 52
End: 2022-10-20

## 2022-10-20 ENCOUNTER — APPOINTMENT (OUTPATIENT)
Dept: GENERAL RADIOLOGY | Age: 52
End: 2022-10-20

## 2022-10-20 ENCOUNTER — TELEPHONE (OUTPATIENT)
Dept: INTERNAL MEDICINE CLINIC | Age: 52
End: 2022-10-20

## 2022-10-20 ENCOUNTER — HOSPITAL ENCOUNTER (OUTPATIENT)
Dept: CT IMAGING | Age: 52
Discharge: HOME | End: 2022-10-20
Attending: INTERNAL MEDICINE
Payer: COMMERCIAL

## 2022-10-20 ENCOUNTER — TRANSCRIBE ORDER (OUTPATIENT)
Dept: REGISTRATION | Age: 52
End: 2022-10-20

## 2022-10-20 DIAGNOSIS — R19.5 LOOSE STOOLS: ICD-10-CM

## 2022-10-20 DIAGNOSIS — K57.92 DIVERTICULITIS: ICD-10-CM

## 2022-10-20 DIAGNOSIS — K52.9 COLITIS: ICD-10-CM

## 2022-10-20 DIAGNOSIS — R50.9 FEVER, UNSPECIFIED FEVER CAUSE: ICD-10-CM

## 2022-10-20 LAB
BASOPHILS # BLD: 0 K/UL (ref 0–0.1)
BASOPHILS NFR BLD: 0 % (ref 0–1)
DIFFERENTIAL METHOD BLD: ABNORMAL
EOSINOPHIL # BLD: 0 K/UL (ref 0–0.4)
EOSINOPHIL NFR BLD: 0 % (ref 0–7)
ERYTHROCYTE [DISTWIDTH] IN BLOOD BY AUTOMATED COUNT: 12.2 % (ref 11.5–14.5)
HCT VFR BLD AUTO: 45.2 % (ref 36.6–50.3)
HGB BLD-MCNC: 15 G/DL (ref 12.1–17)
IMM GRANULOCYTES # BLD AUTO: 0 K/UL (ref 0–0.04)
IMM GRANULOCYTES NFR BLD AUTO: 0 % (ref 0–0.5)
LYMPHOCYTES # BLD: 1.6 K/UL (ref 0.8–3.5)
LYMPHOCYTES NFR BLD: 14 % (ref 12–49)
MCH RBC QN AUTO: 29.1 PG (ref 26–34)
MCHC RBC AUTO-ENTMCNC: 33.2 G/DL (ref 30–36.5)
MCV RBC AUTO: 87.8 FL (ref 80–99)
MONOCYTES # BLD: 1.2 K/UL (ref 0–1)
MONOCYTES NFR BLD: 11 % (ref 5–13)
NEUTS SEG # BLD: 8.1 K/UL (ref 1.8–8)
NEUTS SEG NFR BLD: 75 % (ref 32–75)
NRBC # BLD: 0 K/UL (ref 0–0.01)
NRBC BLD-RTO: 0 PER 100 WBC
PLATELET # BLD AUTO: 241 K/UL (ref 150–400)
PMV BLD AUTO: 10.6 FL (ref 8.9–12.9)
RBC # BLD AUTO: 5.15 M/UL (ref 4.1–5.7)
WBC # BLD AUTO: 10.9 K/UL (ref 4.1–11.1)

## 2022-10-20 PROCEDURE — 74177 CT ABD & PELVIS W/CONTRAST: CPT

## 2022-10-20 PROCEDURE — 74011000636 HC RX REV CODE- 636: Performed by: INTERNAL MEDICINE

## 2022-10-20 RX ORDER — LOPERAMIDE HCL 2 MG
2 TABLET ORAL
Qty: 40 TABLET | Refills: 0 | Status: SHIPPED | OUTPATIENT
Start: 2022-10-20 | End: 2022-10-30

## 2022-10-20 RX ADMIN — IOPAMIDOL 100 ML: 755 INJECTION, SOLUTION INTRAVENOUS at 17:33

## 2022-10-20 NOTE — TELEPHONE ENCOUNTER
Patient brought test back today and has picked up his medication from the pharmacy.  Thanks
Patient came in to get labs that were ordered and is checking to see if Dr. Kecia Feng could recommended anything to take before his lab results come back because he can't keep anything down. Even if he drinks a glass of water, it will run through him. Please call patient.
Not applicable

## 2022-10-21 NOTE — PROGRESS NOTES
Callled pt . C diff pending, to restart flagyl this evening. No pain or fever but 4-5 loose BM's today.

## 2022-10-22 DIAGNOSIS — A04.72 C. DIFFICILE COLITIS: Primary | ICD-10-CM

## 2022-10-22 LAB
C DIFF TOX GENS STL QL NAA+PROBE: POSITIVE
SPECIMEN SOURCE: ABNORMAL

## 2022-10-31 DIAGNOSIS — A04.72 C. DIFFICILE COLITIS: ICD-10-CM

## 2022-11-15 ENCOUNTER — OFFICE VISIT (OUTPATIENT)
Dept: INTERNAL MEDICINE CLINIC | Age: 52
End: 2022-11-15
Payer: COMMERCIAL

## 2022-11-15 VITALS
TEMPERATURE: 97.9 F | RESPIRATION RATE: 18 BRPM | BODY MASS INDEX: 25.74 KG/M2 | WEIGHT: 194.2 LBS | HEART RATE: 101 BPM | HEIGHT: 73 IN | SYSTOLIC BLOOD PRESSURE: 112 MMHG | OXYGEN SATURATION: 95 % | DIASTOLIC BLOOD PRESSURE: 62 MMHG

## 2022-11-15 DIAGNOSIS — R35.1 NOCTURIA: ICD-10-CM

## 2022-11-15 DIAGNOSIS — Z00.00 ROUTINE GENERAL MEDICAL EXAMINATION AT A HEALTH CARE FACILITY: Primary | ICD-10-CM

## 2022-11-15 DIAGNOSIS — A04.72 C. DIFFICILE COLITIS: ICD-10-CM

## 2022-11-15 DIAGNOSIS — I10 HYPERTENSION, UNSPECIFIED TYPE: ICD-10-CM

## 2022-11-15 DIAGNOSIS — E78.5 HYPERLIPIDEMIA, UNSPECIFIED HYPERLIPIDEMIA TYPE: ICD-10-CM

## 2022-11-15 DIAGNOSIS — R14.0 ABDOMINAL BLOATING: ICD-10-CM

## 2022-11-15 PROCEDURE — 99396 PREV VISIT EST AGE 40-64: CPT | Performed by: INTERNAL MEDICINE

## 2022-11-15 RX ORDER — LISINOPRIL 20 MG/1
20 TABLET ORAL DAILY
Qty: 90 TABLET | Refills: 3 | Status: SHIPPED | OUTPATIENT
Start: 2022-11-15

## 2022-11-15 RX ORDER — DICYCLOMINE HYDROCHLORIDE 10 MG/1
10 CAPSULE ORAL
Qty: 40 CAPSULE | Refills: 1 | Status: SHIPPED | OUTPATIENT
Start: 2022-11-15 | End: 2022-11-21 | Stop reason: SDUPTHER

## 2022-11-15 NOTE — PROGRESS NOTES
HISTORY OF PRESENT ILLNESS  Yon Dubose is a 46 y.o. male. HPI  F/u HTN and HLD  Microscopic hematuria and FH colon cancer and CPE  Had recent C diff infection, initial CT reported minimal colitis in descending colon and sigmoid-no diarrhea . Later developed loose stools and repeat CT 10/20 progression to moderate colitis--flagyl then oral Vanc x 10d  Still some bloating and semi solid stools 80% back to normal  stool consistency  Feels like he has trapped gas -difficult to pass the gas sometimes , symptoms in LUQ  5 yr CLN scheduled next month-Dr Paniagua-12/1/2022  EGD 12/20/22 at [facility] per pt for some burning pain in LUQ  Ome lower pain near prostate per pt and urinne frequency 4 times at night. Has complete bladder emptying, normal urine  Home BP---SBP < 120 at home  Last     Patient Active Problem List    Diagnosis Date Noted    Low testosterone in male 05/03/2021    FH: colon cancer 07/24/2019    Panic disorder 11/11/2010    Essential hypertension, benign 05/13/2010    Microscopic hematuria 05/13/2010    Gynecomastia, male 05/13/2010    Pure hypercholesterolemia 05/13/2010     Current Outpatient Medications   Medication Sig Dispense Refill    ciprofloxacin HCl (CIPRO) 500 mg tablet Take 1 Tablet by mouth two (2) times a day. 14 Tablet 0    lisinopril-hydroCHLOROthiazide (PRINZIDE, ZESTORETIC) 20-12.5 mg per tablet TAKE 1 TABLET BY MOUTH EVERY DAY 90 Tablet 4    FA/MV,CA,FE,MIN/LYCOPENE/LUT (MULTIVITAL PO) Take  by mouth.        No Known Allergies   Lab Results   Component Value Date/Time    WBC 10.9 10/20/2022 08:57 AM    HGB 15.0 10/20/2022 08:57 AM    HCT 45.2 10/20/2022 08:57 AM    PLATELET 736 82/48/8492 08:57 AM    MCV 87.8 10/20/2022 08:57 AM     Lab Results   Component Value Date/Time    Glucose 95 10/02/2022 08:51 AM    LDL, calculated 124.6 (H) 11/15/2021 03:21 PM    Creatinine 1.20 10/02/2022 08:51 AM      Lab Results   Component Value Date/Time    Cholesterol, total 202 (H) 11/15/2021 03:21 PM    HDL Cholesterol 61 11/15/2021 03:21 PM    LDL, calculated 124.6 (H) 11/15/2021 03:21 PM    Triglyceride 82 11/15/2021 03:21 PM    CHOL/HDL Ratio 3.3 11/15/2021 03:21 PM     Lab Results   Component Value Date/Time    ALT (SGPT) 29 10/02/2022 08:51 AM    Alk. phosphatase 53 10/02/2022 08:51 AM    Bilirubin, total 0.6 10/02/2022 08:51 AM    Albumin 3.9 10/02/2022 08:51 AM    Protein, total 7.5 10/02/2022 08:51 AM    PLATELET 612 64/02/7513 08:57 AM       Lab Results   Component Value Date/Time    GFR est non-AA >60 10/02/2022 08:51 AM    GFR est AA >60 10/02/2022 08:51 AM    Creatinine 1.20 10/02/2022 08:51 AM    BUN 11 10/02/2022 08:51 AM    Sodium 137 10/02/2022 08:51 AM    Potassium 3.7 10/02/2022 08:51 AM    Chloride 99 10/02/2022 08:51 AM    CO2 33 (H) 10/02/2022 08:51 AM     Lab Results   Component Value Date/Time    TSH 1.12 11/15/2021 03:21 PM      Lab Results   Component Value Date/Time    Glucose 95 10/02/2022 08:51 AM         ROS    Physical Exam  Vitals and nursing note reviewed. Constitutional:       General: He is not in acute distress. Appearance: Normal appearance. He is well-developed. Comments: Appears stated age   HENT:      Head: Normocephalic. Right Ear: Tympanic membrane normal.      Left Ear: Tympanic membrane normal.      Nose: Nose normal.   Eyes:      Pupils: Pupils are equal, round, and reactive to light. Cardiovascular:      Rate and Rhythm: Normal rate and regular rhythm. Heart sounds: Normal heart sounds. Pulmonary:      Effort: Pulmonary effort is normal.      Breath sounds: Normal breath sounds. Abdominal:      Palpations: Abdomen is soft. Musculoskeletal:         General: Normal range of motion. Cervical back: Normal range of motion. Right lower leg: No edema. Left lower leg: No edema. Neurological:      General: No focal deficit present. Mental Status: He is alert.       Coordination: Coordination normal. Psychiatric:         Mood and Affect: Mood normal.       ASSESSMENT and PLAN    ICD-10-CM ICD-9-CM    1. Routine general medical examination at a health care facility  Z00.00 V70.0 PSA SCREENING (SCREENING)      TSH 3RD GENERATION      CBC W/O DIFF      METABOLIC PANEL, COMPREHENSIVE      LIPID PANEL      URINALYSIS W/ REFLEX CULTURE      TESTOSTERONE, TOTAL, ADULT MALE      PSA SCREENING (SCREENING)      TSH 3RD GENERATION      CBC W/O DIFF      METABOLIC PANEL, COMPREHENSIVE      LIPID PANEL      URINALYSIS W/ REFLEX CULTURE      TESTOSTERONE, TOTAL, ADULT MALE      2. Hypertension, unspecified type  I10 401.9 lisinopriL (PRINIVIL, ZESTRIL) 20 mg tablet  Bp is low--d/c hctz      3. Hyperlipidemia, unspecified hyperlipidemia type  E78.5 272.4       4. C. difficile colitis  A04.72 008.45 Diarrhea rsolved      5. Abdominal bloating  R14.0 787.3 dicyclomine (BENTYL) 10 mg capsule  ? IBS post c diff infection? Colonoscopy in 2 weeks -FH colon cancer both parents      10.  Nocturia  R35.1 788.43 REFERRAL TO UROLOGY  UA   Rtc 6 months HTN

## 2022-11-15 NOTE — PROGRESS NOTES
Rm    Chief Complaint   Patient presents with    Annual Wellness Visit        Visit Vitals  /62 (BP 1 Location: Left upper arm, BP Patient Position: Sitting, BP Cuff Size: Adult)   Pulse (!) 101   Temp 97.9 °F (36.6 °C) (Temporal)   Resp 18   Ht 6' 1\" (1.854 m)   Wt 194 lb 3.2 oz (88.1 kg)   SpO2 95%   BMI 25.62 kg/m²        1. Have you been to the ER, urgent care clinic since your last visit? Hospitalized since your last visit? Yes [facility] 10/2/22    2. Have you seen or consulted any other health care providers outside of the [address] [place] [address] since your last visit? Include any pap smears or colon screening. No     Health Maintenance Due   Topic Date Due    Shingrix Vaccine Age 49> (2 of 2) 01/10/2022    Depression Screen  11/15/2022        3 most recent PHQ Screens 11/15/2022   Little interest or pleasure in doing things Not at all   Feeling down, depressed, irritable, or hopeless Not at all   Total Score PHQ 2 0        Fall Risk Assessment, last 12 mths 11/15/2022   Able to walk? Yes   Fall in past 12 months? 0   Do you feel unsteady?  0   Are you worried about falling 0       Learning Assessment 1/5/2015   PRIMARY LEARNER Patient   HIGHEST LEVEL OF EDUCATION - PRIMARY LEARNER  > 4 YEARS OF COLLEGE   BARRIERS PRIMARY LEARNER NONE   CO-LEARNER CAREGIVER No   PRIMARY LANGUAGE ENGLISH   LEARNER PREFERENCE PRIMARY READING   ANSWERED BY patient   RELATIONSHIP SELF

## 2022-11-16 LAB
ALBUMIN SERPL-MCNC: 4 G/DL (ref 3.5–5)
ALBUMIN/GLOB SERPL: 1.1 {RATIO} (ref 1.1–2.2)
ALP SERPL-CCNC: 58 U/L (ref 45–117)
ALT SERPL-CCNC: 47 U/L (ref 12–78)
ANION GAP SERPL CALC-SCNC: 4 MMOL/L (ref 5–15)
APPEARANCE UR: CLEAR
AST SERPL-CCNC: 20 U/L (ref 15–37)
BACTERIA URNS QL MICRO: NEGATIVE /HPF
BILIRUB SERPL-MCNC: 0.9 MG/DL (ref 0.2–1)
BILIRUB UR QL: NEGATIVE
BUN SERPL-MCNC: 16 MG/DL (ref 6–20)
BUN/CREAT SERPL: 14 (ref 12–20)
CALCIUM SERPL-MCNC: 9.3 MG/DL (ref 8.5–10.1)
CHLORIDE SERPL-SCNC: 98 MMOL/L (ref 97–108)
CHOLEST SERPL-MCNC: 206 MG/DL
CO2 SERPL-SCNC: 32 MMOL/L (ref 21–32)
COLOR UR: NORMAL
CREAT SERPL-MCNC: 1.13 MG/DL (ref 0.7–1.3)
EPITH CASTS URNS QL MICRO: NORMAL /LPF
ERYTHROCYTE [DISTWIDTH] IN BLOOD BY AUTOMATED COUNT: 12.5 % (ref 11.5–14.5)
GLOBULIN SER CALC-MCNC: 3.8 G/DL (ref 2–4)
GLUCOSE SERPL-MCNC: 112 MG/DL (ref 65–100)
GLUCOSE UR STRIP.AUTO-MCNC: NEGATIVE MG/DL
HCT VFR BLD AUTO: 44.8 % (ref 36.6–50.3)
HDLC SERPL-MCNC: 64 MG/DL
HDLC SERPL: 3.2 {RATIO} (ref 0–5)
HGB BLD-MCNC: 14.5 G/DL (ref 12.1–17)
HGB UR QL STRIP: NEGATIVE
HYALINE CASTS URNS QL MICRO: NORMAL /LPF (ref 0–5)
KETONES UR QL STRIP.AUTO: NEGATIVE MG/DL
LDLC SERPL CALC-MCNC: 119.2 MG/DL (ref 0–100)
LEUKOCYTE ESTERASE UR QL STRIP.AUTO: NEGATIVE
MCH RBC QN AUTO: 29 PG (ref 26–34)
MCHC RBC AUTO-ENTMCNC: 32.4 G/DL (ref 30–36.5)
MCV RBC AUTO: 89.6 FL (ref 80–99)
NITRITE UR QL STRIP.AUTO: NEGATIVE
NRBC # BLD: 0 K/UL (ref 0–0.01)
NRBC BLD-RTO: 0 PER 100 WBC
PH UR STRIP: 5.5 [PH] (ref 5–8)
PLATELET # BLD AUTO: 232 K/UL (ref 150–400)
PMV BLD AUTO: 11.2 FL (ref 8.9–12.9)
POTASSIUM SERPL-SCNC: 3.4 MMOL/L (ref 3.5–5.1)
PROT SERPL-MCNC: 7.8 G/DL (ref 6.4–8.2)
PROT UR STRIP-MCNC: NEGATIVE MG/DL
PSA SERPL-MCNC: 1.1 NG/ML (ref 0.01–4)
RBC # BLD AUTO: 5 M/UL (ref 4.1–5.7)
RBC #/AREA URNS HPF: NORMAL /HPF (ref 0–5)
SODIUM SERPL-SCNC: 134 MMOL/L (ref 136–145)
SP GR UR REFRACTOMETRY: 1.02 (ref 1–1.03)
TRIGL SERPL-MCNC: 114 MG/DL (ref ?–150)
TSH SERPL DL<=0.05 MIU/L-ACNC: 0.79 UIU/ML (ref 0.36–3.74)
UA: UC IF INDICATED,UAUC: NORMAL
UROBILINOGEN UR QL STRIP.AUTO: 0.2 EU/DL (ref 0.2–1)
VLDLC SERPL CALC-MCNC: 22.8 MG/DL
WBC # BLD AUTO: 5.5 K/UL (ref 4.1–11.1)
WBC URNS QL MICRO: NORMAL /HPF (ref 0–4)

## 2022-11-17 LAB — TESTOST SERPL-MCNC: 179 NG/DL (ref 264–916)

## 2022-11-21 DIAGNOSIS — R14.0 ABDOMINAL BLOATING: ICD-10-CM

## 2022-11-21 RX ORDER — DICYCLOMINE HYDROCHLORIDE 10 MG/1
10 CAPSULE ORAL
Qty: 40 CAPSULE | Refills: 1 | Status: SHIPPED | OUTPATIENT
Start: 2022-11-21

## 2022-11-21 NOTE — PROGRESS NOTES
Dicussed  labs--with pt.pt declines statin for mild LDL elevation.  He will nurys GONZALEZ MD for low T.

## 2023-02-04 DIAGNOSIS — I10 HYPERTENSION, UNSPECIFIED TYPE: ICD-10-CM

## 2023-02-06 RX ORDER — LISINOPRIL 20 MG/1
20 TABLET ORAL DAILY
Qty: 90 TABLET | Refills: 3 | Status: SHIPPED | OUTPATIENT
Start: 2023-02-06

## 2023-02-06 NOTE — TELEPHONE ENCOUNTER
Future Appointments:  No future appointments. Last Appointment With Me:  11/15/2022     Requested Prescriptions     Pending Prescriptions Disp Refills    lisinopriL (PRINIVIL, ZESTRIL) 20 mg tablet 90 Tablet 3     Sig: Take 1 Tablet by mouth daily.

## 2023-07-11 ENCOUNTER — TELEPHONE (OUTPATIENT)
Age: 53
End: 2023-07-11
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
## 2023-07-17 ENCOUNTER — TELEMEDICINE (OUTPATIENT)
Age: 53
End: 2023-07-17
Payer: COMMERCIAL

## 2023-07-17 DIAGNOSIS — S43.61XA SPRAIN OF RIGHT STERNOCLAVICULAR JOINT, INITIAL ENCOUNTER: Primary | ICD-10-CM
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
## 2023-07-17 PROCEDURE — 99213 OFFICE O/P EST LOW 20 MIN: CPT | Performed by: INTERNAL MEDICINE

## 2023-07-17 SDOH — ECONOMIC STABILITY: HOUSING INSECURITY
IN THE LAST 12 MONTHS, WAS THERE A TIME WHEN YOU DID NOT HAVE A STEADY PLACE TO SLEEP OR SLEPT IN A SHELTER (INCLUDING NOW)?: NO

## 2023-07-17 SDOH — ECONOMIC STABILITY: FOOD INSECURITY: WITHIN THE PAST 12 MONTHS, THE FOOD YOU BOUGHT JUST DIDN'T LAST AND YOU DIDN'T HAVE MONEY TO GET MORE.: NEVER TRUE

## 2023-07-17 SDOH — ECONOMIC STABILITY: INCOME INSECURITY: HOW HARD IS IT FOR YOU TO PAY FOR THE VERY BASICS LIKE FOOD, HOUSING, MEDICAL CARE, AND HEATING?: NOT HARD AT ALL

## 2023-07-17 SDOH — ECONOMIC STABILITY: FOOD INSECURITY: WITHIN THE PAST 12 MONTHS, YOU WORRIED THAT YOUR FOOD WOULD RUN OUT BEFORE YOU GOT MONEY TO BUY MORE.: NEVER TRUE

## 2023-07-17 ASSESSMENT — PATIENT HEALTH QUESTIONNAIRE - PHQ9
1. LITTLE INTEREST OR PLEASURE IN DOING THINGS: 0
2. FEELING DOWN, DEPRESSED OR HOPELESS: 0
SUM OF ALL RESPONSES TO PHQ QUESTIONS 1-9: 0
SUM OF ALL RESPONSES TO PHQ9 QUESTIONS 1 & 2: 0
SUM OF ALL RESPONSES TO PHQ QUESTIONS 1-9: 0
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
## 2023-09-25 NOTE — PROGRESS NOTES
Informed pt-he is feeling better on flagyl but will change abx to oral vanco 125 mg q6hr s x10 days
monitored anesthesia care (MAC)

## 2024-02-27 DIAGNOSIS — I10 ESSENTIAL (PRIMARY) HYPERTENSION: ICD-10-CM

## 2024-02-27 RX ORDER — LISINOPRIL 20 MG/1
20 TABLET ORAL DAILY
Qty: 90 TABLET | Refills: 3 | Status: SHIPPED | OUTPATIENT
Start: 2024-02-27
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
## 2024-07-17 ENCOUNTER — OFFICE VISIT (OUTPATIENT)
Age: 54
End: 2024-07-17
Payer: COMMERCIAL

## 2024-07-17 VITALS
RESPIRATION RATE: 18 BRPM | HEIGHT: 73 IN | TEMPERATURE: 97 F | SYSTOLIC BLOOD PRESSURE: 118 MMHG | DIASTOLIC BLOOD PRESSURE: 90 MMHG | BODY MASS INDEX: 26.03 KG/M2 | OXYGEN SATURATION: 99 % | HEART RATE: 81 BPM | WEIGHT: 196.4 LBS

## 2024-07-17 DIAGNOSIS — Z80.0 FH: COLON CANCER: ICD-10-CM

## 2024-07-17 DIAGNOSIS — E29.1 HYPOGONADISM IN MALE: ICD-10-CM

## 2024-07-17 DIAGNOSIS — E78.5 HYPERLIPIDEMIA, UNSPECIFIED HYPERLIPIDEMIA TYPE: ICD-10-CM

## 2024-07-17 DIAGNOSIS — I10 HYPERTENSION, UNSPECIFIED TYPE: ICD-10-CM

## 2024-07-17 DIAGNOSIS — Z00.00 ROUTINE PHYSICAL EXAMINATION: Primary | ICD-10-CM

## 2024-07-17 DIAGNOSIS — G47.00 INSOMNIA, UNSPECIFIED TYPE: Primary | ICD-10-CM

## 2024-07-17 PROCEDURE — 3074F SYST BP LT 130 MM HG: CPT | Performed by: INTERNAL MEDICINE

## 2024-07-17 PROCEDURE — 99396 PREV VISIT EST AGE 40-64: CPT | Performed by: INTERNAL MEDICINE

## 2024-07-17 PROCEDURE — 3080F DIAST BP >= 90 MM HG: CPT | Performed by: INTERNAL MEDICINE

## 2024-07-17 RX ORDER — LISINOPRIL AND HYDROCHLOROTHIAZIDE 20; 12.5 MG/1; MG/1
1 TABLET ORAL DAILY
Qty: 90 TABLET | Refills: 3 | Status: SHIPPED | OUTPATIENT
Start: 2024-07-17

## 2024-07-17 RX ORDER — ALPRAZOLAM 0.25 MG/1
0.25 TABLET ORAL NIGHTLY PRN
Qty: 30 TABLET | Refills: 0 | Status: SHIPPED | OUTPATIENT
Start: 2024-07-17 | End: 2024-08-16

## 2024-07-17 SDOH — ECONOMIC STABILITY: FOOD INSECURITY: WITHIN THE PAST 12 MONTHS, YOU WORRIED THAT YOUR FOOD WOULD RUN OUT BEFORE YOU GOT MONEY TO BUY MORE.: NEVER TRUE

## 2024-07-17 SDOH — ECONOMIC STABILITY: INCOME INSECURITY: HOW HARD IS IT FOR YOU TO PAY FOR THE VERY BASICS LIKE FOOD, HOUSING, MEDICAL CARE, AND HEATING?: NOT HARD AT ALL

## 2024-07-17 SDOH — ECONOMIC STABILITY: FOOD INSECURITY: WITHIN THE PAST 12 MONTHS, THE FOOD YOU BOUGHT JUST DIDN'T LAST AND YOU DIDN'T HAVE MONEY TO GET MORE.: NEVER TRUE

## 2024-07-17 ASSESSMENT — PATIENT HEALTH QUESTIONNAIRE - PHQ9
SUM OF ALL RESPONSES TO PHQ QUESTIONS 1-9: 0
SUM OF ALL RESPONSES TO PHQ QUESTIONS 1-9: 0
2. FEELING DOWN, DEPRESSED OR HOPELESS: NOT AT ALL
SUM OF ALL RESPONSES TO PHQ QUESTIONS 1-9: 0
SUM OF ALL RESPONSES TO PHQ9 QUESTIONS 1 & 2: 0
1. LITTLE INTEREST OR PLEASURE IN DOING THINGS: NOT AT ALL
SUM OF ALL RESPONSES TO PHQ QUESTIONS 1-9: 0

## 2024-07-17 NOTE — PROGRESS NOTES
\"Have you been to the ER, urgent care clinic since your last visit?  Hospitalized since your last visit?\"    NO    “Have you seen or consulted any other health care providers outside of [facility] since your last visit?”    NO        “Have you had a colorectal cancer screening such as a colonoscopy/FIT/Cologuard?    NO    Date of last Colonoscopy: 10/20/2017  No cologuard on file  No FIT/FOBT on file   No flexible sigmoidoscopy on file         Click Here for Release of Records Request

## 2024-07-18 LAB
25(OH)D3 SERPL-MCNC: 44 NG/ML (ref 30–100)
ALBUMIN SERPL-MCNC: 3.9 G/DL (ref 3.5–5)
ALBUMIN/GLOB SERPL: 1.1 (ref 1.1–2.2)
ALP SERPL-CCNC: 68 U/L (ref 45–117)
ALT SERPL-CCNC: 32 U/L (ref 12–78)
ANION GAP SERPL CALC-SCNC: 6 MMOL/L (ref 5–15)
APPEARANCE UR: CLEAR
AST SERPL-CCNC: 10 U/L (ref 15–37)
BACTERIA #/AREA URNS HPF: NORMAL /[HPF]
BILIRUB SERPL-MCNC: 0.5 MG/DL (ref 0.2–1)
BILIRUB UR QL STRIP: NEGATIVE
BUN SERPL-MCNC: 14 MG/DL (ref 6–20)
BUN/CREAT SERPL: 13 (ref 12–20)
CALCIUM SERPL-MCNC: 9.4 MG/DL (ref 8.5–10.1)
CASTS URNS QL MICRO: NORMAL /LPF
CHLORIDE SERPL-SCNC: 104 MMOL/L (ref 97–108)
CHOLEST SERPL-MCNC: 185 MG/DL
CO2 SERPL-SCNC: 29 MMOL/L (ref 21–32)
COLOR UR: YELLOW
CREAT SERPL-MCNC: 1.12 MG/DL (ref 0.7–1.3)
EPI CELLS #/AREA URNS HPF: NORMAL /HPF (ref 0–10)
ERYTHROCYTE [DISTWIDTH] IN BLOOD BY AUTOMATED COUNT: 12.6 % (ref 11.5–14.5)
EST. AVERAGE GLUCOSE BLD GHB EST-MCNC: 105 MG/DL
GLOBULIN SER CALC-MCNC: 3.5 G/DL (ref 2–4)
GLUCOSE SERPL-MCNC: 92 MG/DL (ref 65–100)
GLUCOSE UR QL STRIP: NEGATIVE
HBA1C MFR BLD: 5.3 % (ref 4–5.6)
HCT VFR BLD AUTO: 43.9 % (ref 36.6–50.3)
HDLC SERPL-MCNC: 56 MG/DL
HDLC SERPL: 3.3 (ref 0–5)
HGB BLD-MCNC: 14.7 G/DL (ref 12.1–17)
HGB UR QL STRIP: NEGATIVE
KETONES UR QL STRIP: NEGATIVE
LDLC SERPL CALC-MCNC: 119 MG/DL (ref 0–100)
LEUKOCYTE ESTERASE UR QL STRIP: NEGATIVE
MCH RBC QN AUTO: 29.5 PG (ref 26–34)
MCHC RBC AUTO-ENTMCNC: 33.5 G/DL (ref 30–36.5)
MCV RBC AUTO: 88 FL (ref 80–99)
MICRO URNS: NORMAL
MICRO URNS: NORMAL
NITRITE UR QL STRIP: NEGATIVE
NRBC # BLD: 0 K/UL (ref 0–0.01)
NRBC BLD-RTO: 0 PER 100 WBC
PH UR STRIP: 6 [PH] (ref 5–7.5)
PLATELET # BLD AUTO: 282 K/UL (ref 150–400)
PMV BLD AUTO: 10.3 FL (ref 8.9–12.9)
POTASSIUM SERPL-SCNC: 4.3 MMOL/L (ref 3.5–5.1)
PROT SERPL-MCNC: 7.4 G/DL (ref 6.4–8.2)
PROT UR QL STRIP: NEGATIVE
PSA SERPL-MCNC: 1 NG/ML (ref 0–4)
RBC # BLD AUTO: 4.99 M/UL (ref 4.1–5.7)
RBC #/AREA URNS HPF: NORMAL /HPF (ref 0–2)
REFLEX CRITERIA: NORMAL
SODIUM SERPL-SCNC: 139 MMOL/L (ref 136–145)
SP GR UR STRIP: 1.02 (ref 1–1.03)
TRIGL SERPL-MCNC: 50 MG/DL
TSH SERPL DL<=0.05 MIU/L-ACNC: 0.93 UIU/ML (ref 0.36–3.74)
URINALYSIS REFLEX: NORMAL
UROBILINOGEN UR STRIP-MCNC: 0.2 MG/DL (ref 0.2–1)
VIT B12 SERPL-MCNC: 530 PG/ML (ref 193–986)
VLDLC SERPL CALC-MCNC: 10 MG/DL
WBC # BLD AUTO: 5.1 K/UL (ref 4.1–11.1)
WBC #/AREA URNS HPF: NORMAL /HPF (ref 0–5)

## 2024-07-19 LAB — TESTOST SERPL-MCNC: 270 NG/DL (ref 264–916)

## 2024-07-23 ENCOUNTER — PATIENT MESSAGE (OUTPATIENT)
Age: 54
End: 2024-07-23

## 2025-06-24 DIAGNOSIS — I10 HYPERTENSION, UNSPECIFIED TYPE: ICD-10-CM

## 2025-06-24 RX ORDER — LISINOPRIL AND HYDROCHLOROTHIAZIDE 12.5; 2 MG/1; MG/1
1 TABLET ORAL DAILY
Qty: 90 TABLET | Refills: 3 | Status: SHIPPED | OUTPATIENT
Start: 2025-06-24

## 2025-07-17 ENCOUNTER — TELEPHONE (OUTPATIENT)
Age: 55
End: 2025-07-17

## 2025-07-17 NOTE — TELEPHONE ENCOUNTER
----- Message from VINAY ADORNO MA sent at 7/17/2025  1:18 PM EDT -----  Regarding: FW: [facility] Appointment Request    ----- Message -----  From: Libia Bryant  Sent: 7/17/2025  12:55 PM EDT  To: #  Subject: [facility] Appointment Request                          [facility] Appointment Request    Patient needs appointment for [facility] Appointment Type: Annual Visit.    Patient Requested Dates(s): as soon as possible  Patient Requested Time: anytime   Provider Name: Salazar Alvarez MD     Reason for Appointment Request: Established Patient - Available appointments did not meet patient need  --------------------------------------------------------------------------------------------------------------------------    Relationship to Patient: Self     Call Back Information: OK to leave message on voicemail  Preferred Call Back Number: Phone [phone]      Patient has an appointment on 7/25/2025 and would like to reschedule for a sooner date due to scheduled conflict.

## 2025-07-20 NOTE — PROGRESS NOTES
HISTORY OF PRESENT ILLNESS   Len Sanon   is a 55 y.o.  male.  FU HTN and HLD  Microscopic hematuria and FH colon cancer and hx c diff infection, hypogonadism and CPE  GI Dr Vasquez-FH colon cancer-normal colon 2017. Has been referred for colonoscopy  Home BP wnl  Exercising runs 1-2 miles 4 d per week and lifts weight 3 d per week    Mild  HLD -not on statin but wants to get CT heart scan        2 son  Works at [employer]  Nosmoker, no etoh  Last OV   [facility]----  Home BP-143/89 highest. Dbp around 88-90     C/o thin stools  Due for 5 yr colonoscopy  He is wondering if mihgth have a vitamin deficiency affecting his bowels  Some weight lifting, runs treadmill  Wakes up at 4 am and cannot go back to sleep  Reports sterss at work but dose not want a medication   with 2 sons  Works at [employer]  Nonsmoker, no etoh  Patient Active Problem List    Diagnosis Date Noted    Low testosterone in male 05/03/2021    FH: colon cancer 07/24/2019    Panic disorder 11/11/2010    Pure hypercholesterolemia 05/13/2010    Essential hypertension, benign 05/13/2010    Gynecomastia, male 05/13/2010    Microscopic hematuria 05/13/2010     Current Outpatient Medications   Medication Sig Dispense Refill    lisinopril-hydroCHLOROthiazide (PRINZIDE;ZESTORETIC) 20-12.5 MG per tablet TAKE 1 TABLET BY MOUTH EVERY DAY 90 tablet 3    Multiple Vitamin (MULTIVITAMIN ADULT PO) Take by mouth      lisinopril (PRINIVIL;ZESTRIL) 20 MG tablet TAKE 1 TABLET BY MOUTH EVERY DAY 90 tablet 3     No current facility-administered medications for this visit.     No Known Allergies  Social History     Tobacco Use    Smoking status: Never    Smokeless tobacco: Never   Substance Use Topics    Alcohol use: Not Currently        BMP:   Lab Results   Component Value Date/Time     07/17/2024 09:21 AM    K 4.3 07/17/2024 09:21 AM     07/17/2024 09:21 AM    CO2 29 07/17/2024 09:21 AM    BUN 14 07/17/2024 09:21 AM

## 2025-07-23 ENCOUNTER — OFFICE VISIT (OUTPATIENT)
Age: 55
End: 2025-07-23
Payer: COMMERCIAL

## 2025-07-23 VITALS
OXYGEN SATURATION: 98 % | HEART RATE: 75 BPM | SYSTOLIC BLOOD PRESSURE: 110 MMHG | DIASTOLIC BLOOD PRESSURE: 70 MMHG | TEMPERATURE: 97.6 F | WEIGHT: 196.8 LBS | HEIGHT: 73 IN | BODY MASS INDEX: 26.08 KG/M2 | RESPIRATION RATE: 20 BRPM

## 2025-07-23 DIAGNOSIS — Z00.00 ROUTINE PHYSICAL EXAMINATION: Primary | ICD-10-CM

## 2025-07-23 DIAGNOSIS — E29.1 HYPOGONADISM IN MALE: ICD-10-CM

## 2025-07-23 DIAGNOSIS — I10 HYPERTENSION, UNSPECIFIED TYPE: ICD-10-CM

## 2025-07-23 DIAGNOSIS — Z80.0 FH: COLON CANCER: ICD-10-CM

## 2025-07-23 DIAGNOSIS — E78.5 HYPERLIPIDEMIA, UNSPECIFIED HYPERLIPIDEMIA TYPE: ICD-10-CM

## 2025-07-23 PROCEDURE — 3078F DIAST BP <80 MM HG: CPT | Performed by: INTERNAL MEDICINE

## 2025-07-23 PROCEDURE — 3074F SYST BP LT 130 MM HG: CPT | Performed by: INTERNAL MEDICINE

## 2025-07-23 PROCEDURE — 99396 PREV VISIT EST AGE 40-64: CPT | Performed by: INTERNAL MEDICINE

## 2025-07-23 SDOH — ECONOMIC STABILITY: FOOD INSECURITY: WITHIN THE PAST 12 MONTHS, THE FOOD YOU BOUGHT JUST DIDN'T LAST AND YOU DIDN'T HAVE MONEY TO GET MORE.: NEVER TRUE

## 2025-07-23 SDOH — ECONOMIC STABILITY: FOOD INSECURITY: WITHIN THE PAST 12 MONTHS, YOU WORRIED THAT YOUR FOOD WOULD RUN OUT BEFORE YOU GOT MONEY TO BUY MORE.: NEVER TRUE

## 2025-07-23 ASSESSMENT — PATIENT HEALTH QUESTIONNAIRE - PHQ9
SUM OF ALL RESPONSES TO PHQ QUESTIONS 1-9: 0
2. FEELING DOWN, DEPRESSED OR HOPELESS: NOT AT ALL
SUM OF ALL RESPONSES TO PHQ QUESTIONS 1-9: 0
1. LITTLE INTEREST OR PLEASURE IN DOING THINGS: NOT AT ALL

## 2025-07-23 NOTE — PROGRESS NOTES
Have you been to the ER, urgent care clinic since your last visit?  Hospitalized since your last visit?   NO    Have you seen or consulted any other health care providers outside our system since your last visit?   NO      “Have you had a colorectal cancer screening such as a colonoscopy/FIT/Cologuard?    NO    Date of last Colonoscopy: 10/20/2017  No cologuard on file  No FIT/FOBT on file   No flexible sigmoidoscopy on file

## 2025-07-24 LAB
ALBUMIN SERPL-MCNC: 4 G/DL (ref 3.5–5)
ALBUMIN/GLOB SERPL: 1.1 (ref 1.1–2.2)
ALP SERPL-CCNC: 60 U/L (ref 45–117)
ALT SERPL-CCNC: 36 U/L (ref 12–78)
ANION GAP SERPL CALC-SCNC: 9 MMOL/L (ref 2–12)
AST SERPL-CCNC: 20 U/L (ref 15–37)
BILIRUB SERPL-MCNC: 0.7 MG/DL (ref 0.2–1)
BUN SERPL-MCNC: 16 MG/DL (ref 6–20)
BUN/CREAT SERPL: 14 (ref 12–20)
CALCIUM SERPL-MCNC: 9.5 MG/DL (ref 8.5–10.1)
CHLORIDE SERPL-SCNC: 99 MMOL/L (ref 97–108)
CHOLEST SERPL-MCNC: 197 MG/DL
CO2 SERPL-SCNC: 29 MMOL/L (ref 21–32)
CREAT SERPL-MCNC: 1.16 MG/DL (ref 0.7–1.3)
ERYTHROCYTE [DISTWIDTH] IN BLOOD BY AUTOMATED COUNT: 12.5 % (ref 11.5–14.5)
EST. AVERAGE GLUCOSE BLD GHB EST-MCNC: 103 MG/DL
GLOBULIN SER CALC-MCNC: 3.7 G/DL (ref 2–4)
GLUCOSE SERPL-MCNC: 76 MG/DL (ref 65–100)
HBA1C MFR BLD: 5.2 % (ref 4–5.6)
HCT VFR BLD AUTO: 47.5 % (ref 36.6–50.3)
HDLC SERPL-MCNC: 66 MG/DL
HDLC SERPL: 3 (ref 0–5)
HGB BLD-MCNC: 15 G/DL (ref 12.1–17)
LDLC SERPL CALC-MCNC: 115.4 MG/DL (ref 0–100)
MCH RBC QN AUTO: 28.8 PG (ref 26–34)
MCHC RBC AUTO-ENTMCNC: 31.6 G/DL (ref 30–36.5)
MCV RBC AUTO: 91.2 FL (ref 80–99)
NRBC # BLD: 0 K/UL (ref 0–0.01)
NRBC BLD-RTO: 0 PER 100 WBC
PLATELET # BLD AUTO: 269 K/UL (ref 150–400)
PMV BLD AUTO: 11 FL (ref 8.9–12.9)
POTASSIUM SERPL-SCNC: 3.9 MMOL/L (ref 3.5–5.1)
PROT SERPL-MCNC: 7.7 G/DL (ref 6.4–8.2)
RBC # BLD AUTO: 5.21 M/UL (ref 4.1–5.7)
SODIUM SERPL-SCNC: 137 MMOL/L (ref 136–145)
TRIGL SERPL-MCNC: 78 MG/DL
TSH SERPL DL<=0.05 MIU/L-ACNC: 1.25 UIU/ML (ref 0.36–3.74)
VLDLC SERPL CALC-MCNC: 15.6 MG/DL
WBC # BLD AUTO: 7.2 K/UL (ref 4.1–11.1)

## 2025-07-25 LAB
PSA SERPL-MCNC: 1 NG/ML (ref 0–4)
REFLEX CRITERIA: NORMAL
TESTOST SERPL-MCNC: 265 NG/DL (ref 264–916)